# Patient Record
Sex: FEMALE | Race: WHITE | NOT HISPANIC OR LATINO | Employment: UNEMPLOYED | URBAN - METROPOLITAN AREA
[De-identification: names, ages, dates, MRNs, and addresses within clinical notes are randomized per-mention and may not be internally consistent; named-entity substitution may affect disease eponyms.]

---

## 2017-03-15 ENCOUNTER — TELEPHONE (OUTPATIENT)
Dept: FAMILY MEDICINE CLINIC | Facility: CLINIC | Age: 47
End: 2017-03-15

## 2017-03-15 RX ORDER — TRIAMCINOLONE ACETONIDE 0.25 MG/G
OINTMENT TOPICAL 2 TIMES DAILY
Qty: 15 G | Refills: 0 | Status: SHIPPED | OUTPATIENT
Start: 2017-03-15 | End: 2020-11-17

## 2017-03-15 NOTE — TELEPHONE ENCOUNTER
----- Message from Marian Scott sent at 3/15/2017  9:04 AM EDT -----  Contact: DR. GREEN;  JENNIFER Arnett was last seen in 2005 and she had some contact dermatitis and she was rx Traiamcinolone Acetonide 0.025% External Cream. She has had another flare up but is now out of the cream and wanted to know if Dr. Green could send in a new rx for it. It normally clears it up but she does not have enough of the cream to use this time.     Call back   [phone]

## 2017-11-06 ENCOUNTER — TRANSCRIBE ORDERS (OUTPATIENT)
Dept: MAMMOGRAPHY | Facility: HOSPITAL | Age: 47
End: 2017-11-06

## 2017-11-06 DIAGNOSIS — Z12.31 VISIT FOR SCREENING MAMMOGRAM: Primary | ICD-10-CM

## 2017-12-14 ENCOUNTER — HOSPITAL ENCOUNTER (OUTPATIENT)
Dept: MAMMOGRAPHY | Facility: HOSPITAL | Age: 47
Discharge: HOME | End: 2017-12-14
Attending: OBSTETRICS & GYNECOLOGY | Admitting: OBSTETRICS & GYNECOLOGY

## 2017-12-14 DIAGNOSIS — Z12.31 VISIT FOR SCREENING MAMMOGRAM: ICD-10-CM

## 2017-12-14 PROCEDURE — 77067 SCR MAMMO BI INCL CAD: CPT | Performed by: RADIOLOGY

## 2017-12-14 PROCEDURE — 77063 BREAST TOMOSYNTHESIS BI: CPT

## 2017-12-14 PROCEDURE — G0202 SCR MAMMO BI INCL CAD: HCPCS

## 2017-12-14 PROCEDURE — 77063 BREAST TOMOSYNTHESIS BI: CPT | Performed by: RADIOLOGY

## 2018-12-10 ENCOUNTER — TRANSCRIBE ORDERS (OUTPATIENT)
Dept: ADMINISTRATIVE | Facility: HOSPITAL | Age: 48
End: 2018-12-10

## 2018-12-10 DIAGNOSIS — Z12.31 VISIT FOR SCREENING MAMMOGRAM: Primary | ICD-10-CM

## 2019-01-16 ENCOUNTER — HOSPITAL ENCOUNTER (OUTPATIENT)
Dept: MAMMOGRAPHY | Facility: HOSPITAL | Age: 49
Discharge: HOME | End: 2019-01-16
Attending: OBSTETRICS & GYNECOLOGY | Admitting: OBSTETRICS & GYNECOLOGY

## 2019-01-16 DIAGNOSIS — Z12.31 VISIT FOR SCREENING MAMMOGRAM: ICD-10-CM

## 2019-01-16 PROCEDURE — 77063 BREAST TOMOSYNTHESIS BI: CPT | Performed by: RADIOLOGY

## 2019-01-16 PROCEDURE — 77067 SCR MAMMO BI INCL CAD: CPT

## 2019-01-16 PROCEDURE — 77067 SCR MAMMO BI INCL CAD: CPT | Performed by: RADIOLOGY

## 2019-01-16 PROCEDURE — 77063 BREAST TOMOSYNTHESIS BI: CPT

## 2019-01-28 ENCOUNTER — OFFICE VISIT (OUTPATIENT)
Dept: FAMILY MEDICINE CLINIC | Facility: CLINIC | Age: 49
End: 2019-01-28

## 2019-01-28 VITALS
DIASTOLIC BLOOD PRESSURE: 88 MMHG | HEIGHT: 66 IN | WEIGHT: 221 LBS | HEART RATE: 78 BPM | SYSTOLIC BLOOD PRESSURE: 142 MMHG | BODY MASS INDEX: 35.52 KG/M2 | RESPIRATION RATE: 16 BRPM | TEMPERATURE: 97.9 F

## 2019-01-28 DIAGNOSIS — L65.9 HAIR LOSS: ICD-10-CM

## 2019-01-28 DIAGNOSIS — F41.1 GENERALIZED ANXIETY DISORDER: ICD-10-CM

## 2019-01-28 DIAGNOSIS — I10 ESSENTIAL HYPERTENSION: Primary | ICD-10-CM

## 2019-01-28 PROBLEM — J30.9 ALLERGIC RHINITIS: Status: ACTIVE | Noted: 2019-01-28

## 2019-01-28 LAB
ALBUMIN SERPL-MCNC: 4.81 G/DL (ref 3.2–4.8)
ALBUMIN/GLOB SERPL: 2 G/DL (ref 1.5–2.5)
ALP SERPL-CCNC: 78 U/L (ref 25–100)
ALT SERPL-CCNC: 21 U/L (ref 7–40)
AST SERPL-CCNC: 24 U/L (ref 0–33)
BASOPHILS # BLD AUTO: 0.02 10*3/MM3 (ref 0–0.2)
BASOPHILS NFR BLD AUTO: 0.3 % (ref 0–1)
BILIRUB SERPL-MCNC: 0.4 MG/DL (ref 0.3–1.2)
BUN SERPL-MCNC: 11 MG/DL (ref 9–23)
BUN/CREAT SERPL: 13.3 (ref 7–25)
CALCIUM SERPL-MCNC: 9.3 MG/DL (ref 8.7–10.4)
CHLORIDE SERPL-SCNC: 100 MMOL/L (ref 99–109)
CHOLEST SERPL-MCNC: 120 MG/DL (ref 0–200)
CO2 SERPL-SCNC: 26 MMOL/L (ref 20–31)
CREAT SERPL-MCNC: 0.83 MG/DL (ref 0.6–1.3)
EOSINOPHIL # BLD AUTO: 0.06 10*3/MM3 (ref 0–0.3)
EOSINOPHIL NFR BLD AUTO: 0.8 % (ref 0–3)
ERYTHROCYTE [DISTWIDTH] IN BLOOD BY AUTOMATED COUNT: 13.8 % (ref 11.3–14.5)
GLOBULIN SER CALC-MCNC: 2.4 GM/DL
GLUCOSE SERPL-MCNC: 105 MG/DL (ref 70–100)
HCT VFR BLD AUTO: 39.6 % (ref 34.5–44)
HDLC SERPL-MCNC: 33 MG/DL (ref 40–60)
HGB BLD-MCNC: 12.4 G/DL (ref 11.5–15.5)
IMM GRANULOCYTES # BLD AUTO: 0.03 10*3/MM3 (ref 0–0.03)
IMM GRANULOCYTES NFR BLD AUTO: 0.4 % (ref 0–0.6)
LDLC SERPL CALC-MCNC: 60 MG/DL (ref 0–100)
LYMPHOCYTES # BLD AUTO: 1.87 10*3/MM3 (ref 0.6–4.8)
LYMPHOCYTES NFR BLD AUTO: 25.4 % (ref 24–44)
MCH RBC QN AUTO: 26.3 PG (ref 27–31)
MCHC RBC AUTO-ENTMCNC: 31.3 G/DL (ref 32–36)
MCV RBC AUTO: 84.1 FL (ref 80–99)
MONOCYTES # BLD AUTO: 0.39 10*3/MM3 (ref 0–1)
MONOCYTES NFR BLD AUTO: 5.3 % (ref 0–12)
NEUTROPHILS # BLD AUTO: 4.99 10*3/MM3 (ref 1.5–8.3)
NEUTROPHILS NFR BLD AUTO: 67.8 % (ref 41–71)
PLATELET # BLD AUTO: 266 10*3/MM3 (ref 150–450)
POTASSIUM SERPL-SCNC: 4 MMOL/L (ref 3.5–5.5)
PROT SERPL-MCNC: 7.2 G/DL (ref 5.7–8.2)
RBC # BLD AUTO: 4.71 10*6/MM3 (ref 3.89–5.14)
SODIUM SERPL-SCNC: 135 MMOL/L (ref 132–146)
TRIGL SERPL-MCNC: 135 MG/DL (ref 0–150)
TSH SERPL DL<=0.005 MIU/L-ACNC: 2.69 MIU/ML (ref 0.35–5.35)
VLDLC SERPL CALC-MCNC: 27 MG/DL
WBC # BLD AUTO: 7.36 10*3/MM3 (ref 3.5–10.8)

## 2019-01-28 PROCEDURE — 99214 OFFICE O/P EST MOD 30 MIN: CPT | Performed by: FAMILY MEDICINE

## 2019-01-28 RX ORDER — ESOMEPRAZOLE MAGNESIUM 40 MG/1
CAPSULE, DELAYED RELEASE ORAL
COMMUNITY
Start: 2013-05-22 | End: 2019-07-01 | Stop reason: SDUPTHER

## 2019-01-28 RX ORDER — PROPRANOLOL HYDROCHLORIDE 80 MG/1
80 CAPSULE, EXTENDED RELEASE ORAL DAILY
Qty: 30 CAPSULE | Refills: 2 | Status: SHIPPED | OUTPATIENT
Start: 2019-01-28 | End: 2019-02-27 | Stop reason: SDUPTHER

## 2019-02-27 ENCOUNTER — OFFICE VISIT (OUTPATIENT)
Dept: FAMILY MEDICINE CLINIC | Facility: CLINIC | Age: 49
End: 2019-02-27

## 2019-02-27 VITALS
SYSTOLIC BLOOD PRESSURE: 122 MMHG | WEIGHT: 224 LBS | BODY MASS INDEX: 36 KG/M2 | DIASTOLIC BLOOD PRESSURE: 82 MMHG | HEART RATE: 74 BPM | TEMPERATURE: 98.1 F | HEIGHT: 66 IN | RESPIRATION RATE: 16 BRPM

## 2019-02-27 DIAGNOSIS — I10 ESSENTIAL HYPERTENSION: Primary | ICD-10-CM

## 2019-02-27 DIAGNOSIS — F41.1 GENERALIZED ANXIETY DISORDER: ICD-10-CM

## 2019-02-27 PROCEDURE — 99213 OFFICE O/P EST LOW 20 MIN: CPT | Performed by: FAMILY MEDICINE

## 2019-02-27 RX ORDER — PROPRANOLOL HYDROCHLORIDE 80 MG/1
80 CAPSULE, EXTENDED RELEASE ORAL DAILY
Qty: 30 CAPSULE | Refills: 2 | Status: SHIPPED | OUTPATIENT
Start: 2019-02-27 | End: 2019-06-07 | Stop reason: SDUPTHER

## 2019-02-27 NOTE — PROGRESS NOTES
Subjective   Edwige Nichols is a 48 y.o. female.     History of Present Illness     She is here to recheck her BP  Taking the inderal without issues  Her BP is much better today    She has failed medications in the pat for her mood and had major issues  She lost her job due to bosses death and on a tight budget  father's health    Review of Systems   Psychiatric/Behavioral: The patient is nervous/anxious.        Objective   Physical Exam   Constitutional: She appears well-developed and well-nourished. No distress.   Cardiovascular: Normal rate, regular rhythm and normal heart sounds.   Pulmonary/Chest: Effort normal and breath sounds normal.   Psychiatric: She has a normal mood and affect. Her behavior is normal. Judgment and thought content normal.   Nursing note and vitals reviewed.      Assessment/Plan   Edwige was seen today for follow-up and hypertension.    Diagnoses and all orders for this visit:    Essential hypertension  -     propranolol LA (INDERAL LA) 80 MG 24 hr capsule; Take 1 capsule by mouth Daily.    Generalized anxiety disorder    her BP looks great today, will continue the inderal    I do think she needs medication help for her mood.  She does not want meds due to past history of side effects.  Offered genetic testing for psych meds but we are unsure of cost and so she declined  Names for counselors

## 2019-06-07 DIAGNOSIS — I10 ESSENTIAL HYPERTENSION: ICD-10-CM

## 2019-06-07 RX ORDER — PROPRANOLOL HYDROCHLORIDE 80 MG/1
80 CAPSULE, EXTENDED RELEASE ORAL DAILY
Qty: 30 CAPSULE | Refills: 0 | Status: SHIPPED | OUTPATIENT
Start: 2019-06-07 | End: 2019-07-01 | Stop reason: SDUPTHER

## 2019-07-01 ENCOUNTER — OFFICE VISIT (OUTPATIENT)
Dept: FAMILY MEDICINE CLINIC | Facility: CLINIC | Age: 49
End: 2019-07-01

## 2019-07-01 VITALS
RESPIRATION RATE: 16 BRPM | BODY MASS INDEX: 35.84 KG/M2 | HEART RATE: 66 BPM | TEMPERATURE: 98 F | SYSTOLIC BLOOD PRESSURE: 130 MMHG | WEIGHT: 223 LBS | HEIGHT: 66 IN | DIASTOLIC BLOOD PRESSURE: 83 MMHG

## 2019-07-01 DIAGNOSIS — K21.9 GASTROESOPHAGEAL REFLUX DISEASE, ESOPHAGITIS PRESENCE NOT SPECIFIED: ICD-10-CM

## 2019-07-01 DIAGNOSIS — F41.1 GENERALIZED ANXIETY DISORDER: ICD-10-CM

## 2019-07-01 DIAGNOSIS — I10 ESSENTIAL HYPERTENSION: Primary | ICD-10-CM

## 2019-07-01 DIAGNOSIS — J30.89 SEASONAL ALLERGIC RHINITIS DUE TO OTHER ALLERGIC TRIGGER: ICD-10-CM

## 2019-07-01 PROCEDURE — 99214 OFFICE O/P EST MOD 30 MIN: CPT | Performed by: FAMILY MEDICINE

## 2019-07-01 PROCEDURE — 3008F BODY MASS INDEX DOCD: CPT | Performed by: FAMILY MEDICINE

## 2019-07-01 PROCEDURE — G8417 CALC BMI ABV UP PARAM F/U: HCPCS | Performed by: FAMILY MEDICINE

## 2019-07-01 RX ORDER — PROPRANOLOL HYDROCHLORIDE 80 MG/1
80 CAPSULE, EXTENDED RELEASE ORAL DAILY
Qty: 30 CAPSULE | Refills: 0 | Status: SHIPPED | OUTPATIENT
Start: 2019-07-01 | End: 2019-08-10 | Stop reason: SDUPTHER

## 2019-07-01 RX ORDER — ESOMEPRAZOLE MAGNESIUM 40 MG/1
40 CAPSULE, DELAYED RELEASE ORAL DAILY
Qty: 90 CAPSULE | Refills: 1 | Status: SHIPPED | OUTPATIENT
Start: 2019-07-01 | End: 2020-01-03 | Stop reason: SDUPTHER

## 2019-07-01 NOTE — PROGRESS NOTES
Subjective   Edwige Nichols is a 49 y.o. female.     History of Present Illness     Edwige Nichols  is here for follow-up of hypertension of several years duration. She is not exercising and is not adherent to a low-salt diet. Patient does not check her blood pressure.  Patient denies chest pain and palpitations. Cardiovascular risk factors: hypertension and obesity (BMI >= 30 kg/m2). She is compliant with meds.      Her allergies have been well controlled with zyrtec this season  No major issues  She does take the medicine as needed    GERD is stable  She does use nexium for this and has been happy with resulkts  No SE and no issues    She continues to see counseling for her anxiety  She had genetic testing done to see what medicine would be optimal for her as we have failed multiple options here  She does feel her mood is doing well    The following portions of the patient's history were reviewed and updated as appropriate: allergies, current medications, past family history, past medical history, past social history, past surgical history and problem list.    Review of Systems   Constitutional: Negative.    HENT: Negative.    Eyes: Negative.    Respiratory: Negative.  Negative for shortness of breath.    Cardiovascular: Negative.  Negative for chest pain.   Gastrointestinal: Negative.  Negative for constipation and diarrhea.   Musculoskeletal: Negative.    Skin: Negative.    Neurological: Negative.    Psychiatric/Behavioral: Negative.  Negative for dysphoric mood. The patient is not nervous/anxious.    All other systems reviewed and are negative.      Objective   Physical Exam   Constitutional: She is oriented to person, place, and time. She appears well-developed and well-nourished. No distress.   Cardiovascular: Normal rate, regular rhythm and normal heart sounds.   Pulmonary/Chest: Effort normal and breath sounds normal.   Abdominal: Soft. Bowel sounds are normal. She exhibits no distension. There is no  tenderness.   Neurological: She is alert and oriented to person, place, and time.   Psychiatric: She has a normal mood and affect. Her behavior is normal. Judgment and thought content normal.   Nursing note and vitals reviewed.      Assessment/Plan   Edwige was seen today for hypertension and med refill.    Diagnoses and all orders for this visit:    Essential hypertension  -     propranolol LA (INDERAL LA) 80 MG 24 hr capsule; Take 1 capsule by mouth Daily.    Gastroesophageal reflux disease, esophagitis presence not specified  -     esomeprazole (NEXIUM) 40 MG capsule; Take 1 capsule by mouth Daily.    Seasonal allergic rhinitis due to other allergic trigger  -     Cetirizine HCl (ZYRTEC ALLERGY) 10 MG capsule; 1 po daily    Generalized anxiety disorder    BP looking good today, will continue propranolol unchanged  Allergies have been controlled with zyrtec,will see if insurance covers and script sent in  She still uses the nexium PRN, refilled  Anxiety stable, work up ongoing with psych.  Treatment per them at this time.

## 2019-08-10 DIAGNOSIS — I10 ESSENTIAL HYPERTENSION: ICD-10-CM

## 2019-08-12 RX ORDER — PROPRANOLOL HYDROCHLORIDE 80 MG/1
80 CAPSULE, EXTENDED RELEASE ORAL DAILY
Qty: 30 CAPSULE | Refills: 1 | Status: SHIPPED | OUTPATIENT
Start: 2019-08-12 | End: 2019-12-18 | Stop reason: SDUPTHER

## 2019-12-12 ENCOUNTER — TRANSCRIBE ORDERS (OUTPATIENT)
Dept: ADMINISTRATIVE | Facility: HOSPITAL | Age: 49
End: 2019-12-12

## 2019-12-12 DIAGNOSIS — Z12.31 VISIT FOR SCREENING MAMMOGRAM: Primary | ICD-10-CM

## 2019-12-18 DIAGNOSIS — I10 ESSENTIAL HYPERTENSION: ICD-10-CM

## 2019-12-18 RX ORDER — PROPRANOLOL HYDROCHLORIDE 80 MG/1
80 CAPSULE, EXTENDED RELEASE ORAL DAILY
Qty: 30 CAPSULE | Refills: 0 | Status: SHIPPED | OUTPATIENT
Start: 2019-12-18 | End: 2020-01-03 | Stop reason: SDUPTHER

## 2020-01-03 ENCOUNTER — OFFICE VISIT (OUTPATIENT)
Dept: FAMILY MEDICINE CLINIC | Facility: CLINIC | Age: 50
End: 2020-01-03

## 2020-01-03 VITALS
RESPIRATION RATE: 16 BRPM | DIASTOLIC BLOOD PRESSURE: 92 MMHG | SYSTOLIC BLOOD PRESSURE: 128 MMHG | HEART RATE: 78 BPM | TEMPERATURE: 98.4 F | HEIGHT: 66 IN | BODY MASS INDEX: 35.84 KG/M2 | WEIGHT: 223 LBS

## 2020-01-03 DIAGNOSIS — F41.1 GENERALIZED ANXIETY DISORDER: ICD-10-CM

## 2020-01-03 DIAGNOSIS — M79.10 MYALGIA: ICD-10-CM

## 2020-01-03 DIAGNOSIS — K21.9 GASTROESOPHAGEAL REFLUX DISEASE, ESOPHAGITIS PRESENCE NOT SPECIFIED: ICD-10-CM

## 2020-01-03 DIAGNOSIS — I10 ESSENTIAL HYPERTENSION: Primary | ICD-10-CM

## 2020-01-03 PROCEDURE — 99214 OFFICE O/P EST MOD 30 MIN: CPT | Performed by: FAMILY MEDICINE

## 2020-01-03 RX ORDER — PROPRANOLOL HYDROCHLORIDE 80 MG/1
80 CAPSULE, EXTENDED RELEASE ORAL DAILY
Qty: 90 CAPSULE | Refills: 1 | Status: SHIPPED | OUTPATIENT
Start: 2020-01-03 | End: 2020-11-17

## 2020-01-03 RX ORDER — ESOMEPRAZOLE MAGNESIUM 40 MG/1
40 CAPSULE, DELAYED RELEASE ORAL DAILY
Qty: 90 CAPSULE | Refills: 1 | Status: SHIPPED | OUTPATIENT
Start: 2020-01-03 | End: 2020-11-17

## 2020-01-03 NOTE — PROGRESS NOTES
Subjective   Edwige Nichols is a 49 y.o. female.     History of Present Illness     Edwige Nichols  is here for follow-up of hypertension of several years duration. She is not exercising and is not adherent to a low-salt diet. Patient does not check her blood pressure.   Patient denies chest pain and palpitations. Cardiovascular risk factors: hypertension and obesity (BMI >= 30 kg/m2). She is compliant with meds.      She does complains of body hurting all the time  Has some pelvic floor issues that makes exercise hard and her hips hurt as well.  Tried some PT without help  Hard to stand more than 20 minutes in a row due to this discomfort  Muscles just sore all the time all over  Her joints do not swell but can be quite stiff      GERD has been doing ok with prevacid or protonix she gets OTC  We had that she was on nexium, she feels they all work well  She does want to stay on PPI for her reflux.    The following portions of the patient's history were reviewed and updated as appropriate: allergies, current medications, past family history, past medical history, past social history, past surgical history and problem list.    Review of Systems   Constitutional: Negative.    HENT: Negative.    Eyes: Negative.    Respiratory: Negative.  Negative for shortness of breath.    Cardiovascular: Negative.  Negative for chest pain.   Gastrointestinal: Negative.    Musculoskeletal: Positive for myalgias.   Skin: Negative.    Neurological: Negative.    Psychiatric/Behavioral: The patient is nervous/anxious.    All other systems reviewed and are negative.      Objective   Physical Exam   Constitutional: She is oriented to person, place, and time. She appears well-developed and well-nourished. No distress.   Cardiovascular: Normal rate, regular rhythm and normal heart sounds.   Pulmonary/Chest: Effort normal and breath sounds normal.   Neurological: She is alert and oriented to person, place, and time.   Psychiatric: She has a  normal mood and affect. Her behavior is normal. Judgment and thought content normal.   Nursing note and vitals reviewed.      Assessment/Plan   Edwige was seen today for hypertension.    Diagnoses and all orders for this visit:    Essential hypertension  -     CBC & Differential  -     Comprehensive Metabolic Panel  -     propranolol LA (INDERAL LA) 80 MG 24 hr capsule; Take 1 capsule by mouth Daily.    Gastroesophageal reflux disease, esophagitis presence not specified  -     esomeprazole (nexIUM) 40 MG capsule; Take 1 capsule by mouth Daily.    Myalgia  -     HIGINIO With / DsDNA, RNP, Sjogrens A / B, Smith  -     Sedimentation Rate  -     Rheumatoid Factor  -     Ambulatory Referral to Rheumatology    Generalized anxiety disorder    BP stable, does get higher with conversation and appointment.  Likely anxiety related.  Continue medicine and monitor in the future.  Will continue PPI for GERD  Ok lab work for myalgia as well as rheum evaluation  Psych is working on mood management.  Will start buspar soon.  Control of anxiety would help BP control as well.

## 2020-01-06 LAB
ALBUMIN SERPL-MCNC: 4.8 G/DL (ref 3.5–5.2)
ALBUMIN/GLOB SERPL: 1.8 G/DL
ALP SERPL-CCNC: 77 U/L (ref 39–117)
ALT SERPL-CCNC: 23 U/L (ref 1–33)
ANA SER QL: POSITIVE
AST SERPL-CCNC: 22 U/L (ref 1–32)
BASOPHILS # BLD AUTO: 0.05 10*3/MM3 (ref 0–0.2)
BASOPHILS NFR BLD AUTO: 0.7 % (ref 0–1.5)
BILIRUB SERPL-MCNC: 0.4 MG/DL (ref 0.2–1.2)
BUN SERPL-MCNC: 13 MG/DL (ref 6–20)
BUN/CREAT SERPL: 14.1 (ref 7–25)
CALCIUM SERPL-MCNC: 9.3 MG/DL (ref 8.6–10.5)
CENTROMERE B AB SER-ACNC: <0.2 AI (ref 0–0.9)
CHLORIDE SERPL-SCNC: 99 MMOL/L (ref 98–107)
CHROMATIN AB SERPL-ACNC: <0.2 AI (ref 0–0.9)
CO2 SERPL-SCNC: 24.4 MMOL/L (ref 22–29)
CREAT SERPL-MCNC: 0.92 MG/DL (ref 0.57–1)
DSDNA AB SER-ACNC: <1 IU/ML (ref 0–9)
ENA JO1 AB SER-ACNC: <0.2 AI (ref 0–0.9)
ENA RNP AB SER-ACNC: <0.2 AI (ref 0–0.9)
ENA SCL70 AB SER-ACNC: <0.2 AI (ref 0–0.9)
ENA SM AB SER-ACNC: <0.2 AI (ref 0–0.9)
ENA SS-A AB SER-ACNC: <0.2 AI (ref 0–0.9)
ENA SS-B AB SER-ACNC: 2.4 AI (ref 0–0.9)
EOSINOPHIL # BLD AUTO: 0.08 10*3/MM3 (ref 0–0.4)
EOSINOPHIL NFR BLD AUTO: 1.1 % (ref 0.3–6.2)
ERYTHROCYTE [DISTWIDTH] IN BLOOD BY AUTOMATED COUNT: 13.4 % (ref 12.3–15.4)
ERYTHROCYTE [SEDIMENTATION RATE] IN BLOOD BY WESTERGREN METHOD: 13 MM/HR (ref 0–20)
GLOBULIN SER CALC-MCNC: 2.7 GM/DL
GLUCOSE SERPL-MCNC: 94 MG/DL (ref 65–99)
HCT VFR BLD AUTO: 38.7 % (ref 34–46.6)
HGB BLD-MCNC: 12.6 G/DL (ref 12–15.9)
IMM GRANULOCYTES # BLD AUTO: 0.02 10*3/MM3 (ref 0–0.05)
IMM GRANULOCYTES NFR BLD AUTO: 0.3 % (ref 0–0.5)
LYMPHOCYTES # BLD AUTO: 2.33 10*3/MM3 (ref 0.7–3.1)
LYMPHOCYTES NFR BLD AUTO: 31.6 % (ref 19.6–45.3)
Lab: ABNORMAL
MCH RBC QN AUTO: 26.7 PG (ref 26.6–33)
MCHC RBC AUTO-ENTMCNC: 32.6 G/DL (ref 31.5–35.7)
MCV RBC AUTO: 82 FL (ref 79–97)
MONOCYTES # BLD AUTO: 0.33 10*3/MM3 (ref 0.1–0.9)
MONOCYTES NFR BLD AUTO: 4.5 % (ref 5–12)
NEUTROPHILS # BLD AUTO: 4.57 10*3/MM3 (ref 1.7–7)
NEUTROPHILS NFR BLD AUTO: 61.8 % (ref 42.7–76)
NRBC BLD AUTO-RTO: 0 /100 WBC (ref 0–0.2)
PLATELET # BLD AUTO: 280 10*3/MM3 (ref 140–450)
POTASSIUM SERPL-SCNC: 4.4 MMOL/L (ref 3.5–5.2)
PROT SERPL-MCNC: 7.5 G/DL (ref 6–8.5)
RBC # BLD AUTO: 4.72 10*6/MM3 (ref 3.77–5.28)
RHEUMATOID FACT SERPL-ACNC: <10 IU/ML (ref 0–13.9)
SODIUM SERPL-SCNC: 139 MMOL/L (ref 136–145)
WBC # BLD AUTO: 7.38 10*3/MM3 (ref 3.4–10.8)

## 2020-02-07 ENCOUNTER — HOSPITAL ENCOUNTER (OUTPATIENT)
Dept: MAMMOGRAPHY | Facility: HOSPITAL | Age: 50
Discharge: HOME | End: 2020-02-07
Admitting: OBSTETRICS & GYNECOLOGY

## 2020-02-07 DIAGNOSIS — Z12.31 VISIT FOR SCREENING MAMMOGRAM: ICD-10-CM

## 2020-02-07 PROCEDURE — 77067 SCR MAMMO BI INCL CAD: CPT

## 2020-02-07 PROCEDURE — 77067 SCR MAMMO BI INCL CAD: CPT | Performed by: RADIOLOGY

## 2020-02-07 PROCEDURE — 77063 BREAST TOMOSYNTHESIS BI: CPT

## 2020-02-07 PROCEDURE — 77063 BREAST TOMOSYNTHESIS BI: CPT | Performed by: RADIOLOGY

## 2020-10-16 ENCOUNTER — OFFICE VISIT (OUTPATIENT)
Dept: OBSTETRICS AND GYNECOLOGY | Facility: CLINIC | Age: 50
End: 2020-10-16

## 2020-10-16 VITALS
SYSTOLIC BLOOD PRESSURE: 120 MMHG | BODY MASS INDEX: 34.55 KG/M2 | HEIGHT: 66 IN | TEMPERATURE: 97.9 F | WEIGHT: 215 LBS | DIASTOLIC BLOOD PRESSURE: 80 MMHG

## 2020-10-16 DIAGNOSIS — Z00.00 ANNUAL PHYSICAL EXAM: Primary | ICD-10-CM

## 2020-10-16 DIAGNOSIS — E66.9 OBESITY (BMI 30-39.9): ICD-10-CM

## 2020-10-16 PROCEDURE — 99396 PREV VISIT EST AGE 40-64: CPT | Performed by: NURSE PRACTITIONER

## 2020-10-16 NOTE — PROGRESS NOTES
GYN Annual Exam     CC - Here for annual exam.        HPI  Edwige Nichols is a 50 y.o. female, , who presents for annual well woman exam. Patient's last menstrual period was 2020 (exact date)..  Periods are becoming irregular.  Dysmenorrhea:mild, occurring premenstrually and first 1-2 days of flow.  Patient reports problems with: none.  Partner Status: Marital Status: .  New Partners since last visit: no.  Desires STD Screening: no. The patient had an annual exam one year ago. Since her last visit she was diagnosed with Sjorgens. Her last mammogram was 20 and negative. Her periods are becoming irregular and she has started having pre-menstrual headaches.    Additional OB/GYN History   Current contraception: contraceptive methods: Condoms  Desires to: continue contraception  Last Pap : 2019 negative  Last Completed Pap Smear       Status Date      PAP SMEAR No completions recorded        History of abnormal Pap smear: no  Family history of uterine, colon, breast, or ovarian cancer: yes - maternal grandmother had breast cancer at age 80  Performs monthly Self-Breast Exam: yes  Last mammogram: 2020 negative  Last Completed Mammogram     Patient has no health maintenance due at this time         Exercises Regularly: no  Feelings of Anxiety or Depression: yes - not treated  Tobacco Usage?: No   OB History        1    Para   1    Term   1            AB        Living           SAB        TAB        Ectopic        Molar        Multiple        Live Births                    Health Maintenance   Topic Date Due   • Annual Gynecologic Pelvic and Breast Exam  1971   • ANNUAL PHYSICAL  1974   • TDAP/TD VACCINES (1 - Tdap) 1990   • HEPATITIS C SCREENING  03/15/2017   • PAP SMEAR  03/15/2017   • INFLUENZA VACCINE  2020   • COLONOSCOPY  2027   • Pneumococcal Vaccine 0-64  Aged Out       The additional following portions of the patient's history were  "reviewed and updated as appropriate: allergies, current medications, past family history, past medical history, past social history, past surgical history and problem list.    Review of Systems   Constitutional: Negative.    HENT: Negative.    Eyes: Negative.    Respiratory: Negative.    Cardiovascular: Negative.    Gastrointestinal: Negative.    Endocrine: Negative.    Genitourinary: Negative.    Musculoskeletal: Negative.    Skin: Negative.    Allergic/Immunologic: Negative.    Neurological: Negative.    Hematological: Negative.    Psychiatric/Behavioral: Negative.      All other systems reviewed and are negative.     I have reviewed and agree with the HPI, ROS, and historical information as entered above. Keiko Gann, APRN    Objective   /80   Temp 97.9 °F (36.6 °C)   Ht 167.6 cm (66\")   Wt 97.5 kg (215 lb)   LMP 09/08/2020 (Exact Date)   Breastfeeding No Comment: perimenopause  BMI 34.70 kg/m²     Physical Exam  Vitals signs and nursing note reviewed. Exam conducted with a chaperone present.   Constitutional:       Appearance: Normal appearance. She is well-developed. She is obese.   HENT:      Head: Normocephalic and atraumatic.   Neck:      Musculoskeletal: Normal range of motion. No muscular tenderness.      Thyroid: No thyroid mass or thyromegaly.   Cardiovascular:      Rate and Rhythm: Normal rate and regular rhythm.      Heart sounds: No murmur.   Pulmonary:      Effort: Pulmonary effort is normal. No retractions.      Breath sounds: Normal breath sounds. No wheezing, rhonchi or rales.   Chest:      Chest wall: No mass or tenderness.      Breasts:         Right: Normal. No mass, nipple discharge, skin change or tenderness.         Left: Normal. No mass, nipple discharge, skin change or tenderness.   Abdominal:      General: Bowel sounds are normal.      Palpations: Abdomen is soft. Abdomen is not rigid. There is no mass.      Tenderness: There is no abdominal tenderness. There is no guarding. "      Hernia: No hernia is present. There is no hernia in the left inguinal area.   Genitourinary:     Labia:         Right: No rash, tenderness or lesion.         Left: No rash, tenderness or lesion.       Vagina: Normal. No vaginal discharge or lesions.      Cervix: No cervical motion tenderness, discharge, lesion or cervical bleeding.      Uterus: Normal. Not enlarged, not fixed and not tender.       Adnexa:         Right: No mass or tenderness.          Left: No mass or tenderness.        Rectum: No external hemorrhoid.   Neurological:      Mental Status: She is alert and oriented to person, place, and time.   Psychiatric:         Behavior: Behavior normal.            Assessment and Plan    Problem List Items Addressed This Visit     None      Visit Diagnoses     Annual physical exam    -  Primary    Relevant Orders    Pap IG, HPV-hr    Obesity (BMI 30-39.9)              1. GYN annual well woman exam.   2. Reviewed monthly self breast exams.  Instructed to call with lumps, pain, or breast discharge.    3. Recommended use of Vitamin D replacement and getting adequate calcium in her diet. (1500mg)  4. Reviewed exercise as a preventative health measures.   5. Reccommended Flu Vaccine in Fall of each year.  6. Symptoms of menopausal transition reviewed with patient.   7. RTC in 1 year or PRN with problems.    Keiko Gann, APRN  10/16/2020

## 2020-11-03 DIAGNOSIS — Z00.00 ANNUAL PHYSICAL EXAM: ICD-10-CM

## 2020-11-17 ENCOUNTER — OFFICE VISIT (OUTPATIENT)
Dept: NEUROLOGY | Facility: CLINIC | Age: 50
End: 2020-11-17

## 2020-11-17 VITALS
HEIGHT: 66 IN | HEART RATE: 89 BPM | BODY MASS INDEX: 34.91 KG/M2 | WEIGHT: 217.2 LBS | TEMPERATURE: 97.5 F | SYSTOLIC BLOOD PRESSURE: 150 MMHG | OXYGEN SATURATION: 97 % | DIASTOLIC BLOOD PRESSURE: 96 MMHG

## 2020-11-17 DIAGNOSIS — R20.2 PARESTHESIAS: Primary | ICD-10-CM

## 2020-11-17 DIAGNOSIS — G43.009 MIGRAINE WITHOUT AURA AND WITHOUT STATUS MIGRAINOSUS, NOT INTRACTABLE: ICD-10-CM

## 2020-11-17 DIAGNOSIS — R42 DIZZINESS: ICD-10-CM

## 2020-11-17 DIAGNOSIS — R23.0 BLUISH SKIN DISCOLORATION: ICD-10-CM

## 2020-11-17 PROCEDURE — 99204 OFFICE O/P NEW MOD 45 MIN: CPT | Performed by: PSYCHIATRY & NEUROLOGY

## 2020-11-17 RX ORDER — LANSOPRAZOLE 15 MG/1
15 CAPSULE, DELAYED RELEASE ORAL AS NEEDED
COMMUNITY

## 2020-11-17 RX ORDER — VENLAFAXINE HYDROCHLORIDE 37.5 MG/1
37.5 CAPSULE, EXTENDED RELEASE ORAL DAILY
Qty: 30 CAPSULE | Refills: 2 | Status: SHIPPED | OUTPATIENT
Start: 2020-11-17 | End: 2021-01-12

## 2020-11-17 NOTE — PROGRESS NOTES
Subjective:    CC: Edwige Nichols is seen today in consultation for dizziness, paresthesias, bluish discoloration of skin and Numbness       HPI:  Patient is a 50-year-old female with past medical history of confirm Sjogren's syndrome, vocal cord dysfunction, IBS, fibromyalgia referred to the clinic for episodic dizziness, paresthesias in both the hands and arms as well as bluish discoloration involving the skin of fingers of both hands.  She reports that dizzy spells started about 5 years ago and she initially noted that whenever she is on any form of height, the dizziness is heightened.  It is difficult for her to climb ladder now because of this.  She reports that occasionally, she also has dizziness when she is not on any higher altitude.  She also reports associated ringing in her ears.  In addition to this, she reports frequent occipital area headache which sometimes may radiate to involve top of her head.  She reports that the most important concern is bluish discoloration of skin involving fingers of both hands.  She reports that if she has to carry anything heavy she will end up wearing wrist brace discoloration which would lead to electrical shocklike sensation, pain that may last for few hours.  Sometimes even if she has not held anything heavy but has something in her hand despite picking up small things may lead to bruise discoloration leading to pain, paresthesias, dysesthesias lasting for 30 to 40 minutes.  The bluish discoloration takes about 48 hours to subside.  This is been going on for last several years.  In addition to this, she also reports tingling in her arms, fingers and sometimes pain.  The symptoms are particularly worse at nighttime and sometimes may wake her up in the middle of the night.  She reports that back in 1990s, she underwent EMG/nerve conduction study and she was told that she may have carpal tunnel syndrome.  She also reports having MRI performed about 10 to 11 years ago when  she saw Dr. Davis and reports that she was told that it was normal.  Her father has diagnosis of myasthenia gravis.  She also reports uncontrolled anxiety.        The following portions of the patient's history were reviewed today and updated as of 11/17/2020  : allergies, social history and problem list.  This document will be scanned to patient's chart.      Current Outpatient Medications:   •  Carboxymethylcellulose Sodium (EYE DROPS OP), Apply  to eye(s) as directed by provider., Disp: , Rfl:   •  lansoprazole (PREVACID) 15 MG capsule, Take 15 mg by mouth Daily., Disp: , Rfl:    Past Medical History:   Diagnosis Date   • Costochondritis    • Depression    • Fibromyalgia    • IBS (irritable bowel syndrome)    • Muscle disorder    • Muscle pain    • Sacro-iliac pain     joint dysfunction   • Screening breast examination     Slef; Admits   • Sjogren's disease (CMS/HCC)    • Urethral syndrome    • Vocal cord dysfunction       Past Surgical History:   Procedure Laterality Date   • BREAST BIOPSY Left    • COLONOSCOPY     • LAPAROSCOPIC CHOLECYSTECTOMY     • REDUCTION MAMMAPLASTY Bilateral 2014   • WISDOM TOOTH EXTRACTION        Family History   Problem Relation Age of Onset   • Breast cancer Maternal Grandmother 80   • No Known Problems Mother    • Myasthenia gravis Father    • Hypertension Father    • Arthritis Other    • Diabetes Other    • Melanoma Other    • Ovarian cancer Neg Hx       Review of Systems   Constitutional: Negative.    HENT: Negative.    Eyes: Negative.    Respiratory: Negative.    Cardiovascular: Negative.    Gastrointestinal: Negative.    Endocrine: Negative.    Genitourinary: Positive for pelvic pain.   Musculoskeletal: Negative.    Skin: Negative.    Allergic/Immunologic: Negative.    Neurological: Positive for dizziness, numbness and headache.   Psychiatric/Behavioral: Positive for depressed mood. The patient is nervous/anxious.        All other systems reviewed and are negative    "  Objective:    /96   Pulse 89   Temp 97.5 °F (36.4 °C)   Ht 167.6 cm (66\")   Wt 98.5 kg (217 lb 3.2 oz)   SpO2 97%   BMI 35.06 kg/m²     Neurology Exam:    General apperance: NAD.     Mental status: Alert, awake and oriented to time place and person.    Recent and Remote memory: Can recall 3/3 objects at 5 minutes. Can recall historical events.     Attention span and Concentration: Serial 7s: Normal.     Fund of knowledge:  Normal.     Language and Speech: No aphasia or dysarthria.    Naming , Repitition and Comprehension:  Can name objects, repeat a sentence and follow commands. Speech is clear and fluent with good repetition, comprehension, and naming.    Cranial Nerves:   CN II: Visual fields are full. Intact. Fundi - Normal, No papillederma, Pupils - SERENITY  CN III, IV and VI: Extraocular movements are intact. Normal saccades.   CN V: Facial sensation is intact.   CN VII: Muscles of facial expression reveal no asymmetry. Intact.   CN VIII: Hearing is intact. Whispered voice intact.   CN IX and X: Palate elevates symmetrically. Intact  CN XI: Shoulder shrug is intact.   CN XII: Tongue is midline without evidence of atrophy or fasciculation.     Motor:  Right UE muscle strength 5/5. Normal tone.     Left UE muscle strength 5/5. Normal tone.      Right LE muscle strength5/5. Normal tone.     Left LE muscle strength 5/5. Normal tone.      Sensory: Normal light touch, vibration and pinprick sensation bilaterally.    DTRs: 2+ bilaterally in upper and lower extremities.  Positive Tinel's test bilaterally at the wrist.    Babinski: Negative bilaterally.    Co-ordination: Normal finger-to-nose, heel to shin B/L.    Rhomberg: Negative.    Gait: Normal.    Cardiovascular: Regular rate and rhythm without murmur, gallop or rub.    Ophthalmoscopic exam: Normal fundi, no papilledema.    Assessment and Plan:  1. Paresthesias  2. Dizziness  3. Migraine without aura and without status migrainosus, not intractable  4. " Bluish skin discoloration  -Likely carpal tunnel syndrome causing paresthesias in both arms and hands particularly becoming worse at night.  I am going to schedule her for EMG/nerve conduction study for the confirmation of the diagnosis and also to determine severity.  I am unsure as to what could be the reason for the bluish discoloration involving the skin of fingers of both hands.  Typically neuropathy will not cause any skin discoloration.  She is reporting dizziness which has been ongoing associated with episodes of ringing in her years.  She has not had ENT evaluations I would like to obtain ENT consultation for further evaluation.  Since she is reporting frequent headaches/migraines, I am going to start her on Effexor 37.5 mg daily and see how she does.  She is reporting uncontrolled anxiety as well and hopefully Effexor should help with that as well.  I plan to see her back in 8 weeks for follow-up.       Return in about 8 weeks (around 1/12/2021).     Tian Vieira MD

## 2021-01-05 ENCOUNTER — TRANSCRIBE ORDERS (OUTPATIENT)
Dept: OBSTETRICS AND GYNECOLOGY | Facility: CLINIC | Age: 51
End: 2021-01-05

## 2021-01-05 DIAGNOSIS — Z12.31 VISIT FOR SCREENING MAMMOGRAM: Primary | ICD-10-CM

## 2021-01-12 ENCOUNTER — OFFICE VISIT (OUTPATIENT)
Dept: NEUROLOGY | Facility: CLINIC | Age: 51
End: 2021-01-12

## 2021-01-12 VITALS
SYSTOLIC BLOOD PRESSURE: 144 MMHG | TEMPERATURE: 97.3 F | BODY MASS INDEX: 35.07 KG/M2 | OXYGEN SATURATION: 99 % | DIASTOLIC BLOOD PRESSURE: 94 MMHG | HEART RATE: 83 BPM | HEIGHT: 66 IN

## 2021-01-12 DIAGNOSIS — R20.2 PARESTHESIAS: Primary | ICD-10-CM

## 2021-01-12 DIAGNOSIS — R42 DIZZINESS: ICD-10-CM

## 2021-01-12 DIAGNOSIS — R23.0 BLUISH SKIN DISCOLORATION: ICD-10-CM

## 2021-01-12 PROCEDURE — 99214 OFFICE O/P EST MOD 30 MIN: CPT | Performed by: PSYCHIATRY & NEUROLOGY

## 2021-01-12 RX ORDER — CETIRIZINE HYDROCHLORIDE 10 MG/1
CAPSULE, LIQUID FILLED ORAL
COMMUNITY
End: 2021-11-29

## 2021-01-12 NOTE — PROGRESS NOTES
Subjective:    CC: Edwige Nichols is in clinic today for follow up for history of paresthesias in both the hands, bluish discoloration of skin of both the hands and dizziness.    HPI:  Patient is a 50-year-old female with past medical history of  Sjogren's syndrome, vocal cord dysfunction, IBS, fibromyalgia referred to the clinic for episodic dizziness, paresthesias in both the hands and arms as well as bluish discoloration involving the skin of fingers of both hands.  She reports that dizzy spells started about 5 years ago and she initially noted that whenever she is on any form of height, the dizziness is heightened.  It is difficult for her to climb ladder now because of this.  She reports that occasionally, she also has dizziness when she is not on any higher altitude.  She also reports associated ringing in her ears.  In addition to this, she reports frequent occipital area headache which sometimes may radiate to involve top of her head.  She reports that the most important concern is bluish discoloration of skin involving fingers of both hands.  She reports that if she has to carry anything heavy she will end up wearing wrist brace discoloration which would lead to electrical shocklike sensation, pain that may last for few hours.  Sometimes even if she has not held anything heavy but has something in her hand despite picking up small things may lead to bruise discoloration leading to pain, paresthesias, dysesthesias lasting for 30 to 40 minutes.  The bluish discoloration takes about 48 hours to subside.  This is been going on for last several years.  In addition to this, she also reports tingling in her arms, fingers and sometimes pain.  The symptoms are particularly worse at nighttime and sometimes may wake her up in the middle of the night.  She reports that back in 1990s, she underwent EMG/nerve conduction study and she was told that she may have carpal tunnel syndrome.  She also reports having MRI  performed about 10 to 11 years ago when she saw Dr. Davis and reports that she was told that it was normal.  Her father has diagnosis of myasthenia gravis.  She also reports uncontrolled anxiety.    Follow-up: 1/12/2021: She is in clinic for regular follow-up.  Since her last visit in November, she has now done ENT consultation and was told that she has persistent, perceptual, positional dizziness.  Unfortunately, the treatment for this is either SSRI or SNRI to which she has developed some side effects in the past.  She was also prescribed Effexor on the initial visit which she could not tolerate so stopped taking it.  Luckily, she has not had any more episodes of discoloration of skin of fingers since her last visit with me.  She did some research on this and was found an article about a condition called paroxysmal hamartoma of skin which causes temporary discoloration of skin lasting for 2 to 3 days.  Based on review of this article, this is fairly benign condition without any permanent damage.  She is scheduled to have EMG/nerve conduction study in February.    The following portions of the patient's history were reviewed and updated as of 01/12/2021: allergies, social history and problem list.       Current Outpatient Medications:   •  Carboxymethylcellulose Sodium (EYE DROPS OP), Apply  to eye(s) as directed by provider., Disp: , Rfl:   •  Cetirizine HCl (ZyrTEC Allergy) 10 MG capsule, Zyrtec, Disp: , Rfl:   •  lansoprazole (PREVACID) 15 MG capsule, Take 15 mg by mouth Daily., Disp: , Rfl:    Past Medical History:   Diagnosis Date   • Costochondritis    • Depression    • Fibromyalgia    • IBS (irritable bowel syndrome)    • Muscle disorder    • Muscle pain    • Persistent postural-perceptual dizziness    • Sacro-iliac pain     joint dysfunction   • Screening breast examination     Slef; Admits   • Sjogren's disease (CMS/HCC)    • Urethral syndrome    • Vocal cord dysfunction       Past Surgical History:  "  Procedure Laterality Date   • BREAST BIOPSY Left    • COLONOSCOPY     • LAPAROSCOPIC CHOLECYSTECTOMY     • REDUCTION MAMMAPLASTY Bilateral 2014   • WISDOM TOOTH EXTRACTION        Family History   Problem Relation Age of Onset   • Breast cancer Maternal Grandmother 80   • No Known Problems Mother    • Myasthenia gravis Father    • Hypertension Father    • Arthritis Other    • Diabetes Other    • Melanoma Other    • Ovarian cancer Neg Hx         Review of Systems   Constitutional: Negative.    HENT: Negative.    Eyes: Negative.    Respiratory: Negative.    Cardiovascular: Negative.    Gastrointestinal: Negative.    Endocrine: Negative.    Genitourinary: Negative.    Musculoskeletal: Positive for neck pain.   Skin: Negative.    Allergic/Immunologic: Negative.    Neurological: Positive for numbness and headache.   Hematological: Bruises/bleeds easily.   Psychiatric/Behavioral: The patient is nervous/anxious.      Objective:    /94   Pulse 83   Temp 97.3 °F (36.3 °C)   Ht 167.6 cm (65.98\")   SpO2 99%   BMI 35.07 kg/m²     Neurology Exam:  General apperance: NAD.     Mental status: Alert, awake and oriented to time place and person.    Recent and Remote memory: Can recall 3/3 objects at 5 minutes. Can recall historical events.     Attention span and Concentration: Serial 7s: Normal.     Fund of knowledge:  Normal.     Language and Speech: No aphasia or dysarthria.    Naming , Repitition and Comprehension:  Can name objects, repeat a sentence and follow commands. Speech is clear and fluent with good repetition, comprehension, and naming.    CN II to XII: Intact.    Opthalmoscopic Exam: No papilledema.    Motor:  Right UE muscle strength 5/5. Normal tone.     Left UE muscle strength 5/5. Normal tone.      Right LE muscle strength5/5. Normal tone.     Left LE muscle strength 5/5. Normal tone.      Sensory: Normal light touch, vibration and pinprick sensation bilaterally.    DTRs: 2+ bilaterally.    Babinski: " Negative bilaterally.    Co-ordination: Normal finger-to-nose, heel to shin B/L.    Rhomberg: Negative.    Gait: Normal.    Cardiovascular: Regular rate and rhythm without murmur, gallop or rub.    Assessment and Plan:  1. Paresthesias  2. Dizziness  3. Bluish skin discoloration  ?  Paroxysmal hamartoma of skin causing loose discoloration.  Upon my personal review, this is fairly benign condition and should not be causing ongoing paresthesias that she has been experiencing.  She is going to have EMG/nerve conduction study for further evaluation and to rule out possibility of carpal tunnel syndrome or ulnar neuropathy causing her ongoing paresthesias.  Continue to follow-up with ENT as per schedule for PPPD.  The recommendation was to consider vestibular/balance therapy as she is on intolerant to a lot of SSRIs/SNRIs.  I plan to see her back in 8 weeks for follow-up.       I spent 25 minutes face to face with the patient and spent more than 50% of this time  in management, instructions and education regarding above mentioned diagnosis and also on counseling and discussing about taking medication regularly, possible side effects with medication use, importance of good sleep hygiene, good hydration and regular exercise.    Return in about 8 weeks (around 3/9/2021).

## 2021-01-20 ENCOUNTER — TELEPHONE (OUTPATIENT)
Dept: NEUROLOGY | Facility: CLINIC | Age: 51
End: 2021-01-20

## 2021-01-20 NOTE — TELEPHONE ENCOUNTER
I spoke with patient. She has been informed that EMG test does not require any pre-approval. I suggested she contacted the billing department if she is needing to know how much they would be billing her. I provided with that department number. She verbalized understanding.   -TMT

## 2021-01-20 NOTE — TELEPHONE ENCOUNTER
PT HAS CALLED TO DOUBLE CHECK INSURANCE COVERAGE FOR EMG TESTED SCHEDULED WITH DR. PATTERSON ON 2/4/21. PT STATES SHE HAS A NEW INSURANCE AND HIGH DEDUCTIBLE. SHE IS WANTING TO CLARIFY INSURANCE COVERAGE AND WHAT THE COSTS MAY BE FOLLOWING TESTING AND AFTER INSURANCE COVERAGE.     PT CAN BE REACHED AT [phone].    PLEASE REVIEW AND ADVISE.

## 2021-02-01 ENCOUNTER — OFFICE VISIT (OUTPATIENT)
Dept: FAMILY MEDICINE CLINIC | Facility: CLINIC | Age: 51
End: 2021-02-01

## 2021-02-01 VITALS
SYSTOLIC BLOOD PRESSURE: 160 MMHG | BODY MASS INDEX: 35.2 KG/M2 | HEIGHT: 66 IN | HEART RATE: 78 BPM | DIASTOLIC BLOOD PRESSURE: 100 MMHG | RESPIRATION RATE: 18 BRPM | TEMPERATURE: 98 F | WEIGHT: 219 LBS

## 2021-02-01 DIAGNOSIS — M79.10 MYALGIA: Primary | ICD-10-CM

## 2021-02-01 DIAGNOSIS — M79.604 RIGHT LEG PAIN: ICD-10-CM

## 2021-02-01 PROBLEM — M79.7 FIBROMYALGIA: Status: ACTIVE | Noted: 2020-04-24

## 2021-02-01 PROBLEM — M35.00 SJOGREN'S SYNDROME (HCC): Status: ACTIVE | Noted: 2020-04-24

## 2021-02-01 PROCEDURE — 99213 OFFICE O/P EST LOW 20 MIN: CPT | Performed by: FAMILY MEDICINE

## 2021-02-01 RX ORDER — ETODOLAC 400 MG/1
400 TABLET, FILM COATED ORAL 2 TIMES DAILY
Qty: 60 TABLET | Refills: 1 | Status: SHIPPED | OUTPATIENT
Start: 2021-02-01 | End: 2021-03-09

## 2021-02-01 NOTE — PROGRESS NOTES
Subjective   Edwige Nichols is a 50 y.o. female.     History of Present Illness     She has seen rheumatologist and neurologist for issues  She has a lot of issues with numbness in her arms and legs all the time    She has feeling in her legs and arms for a long time  It feels like sensation of metal in her veins in her arms and lefts  This in medial B knees and B antecubital fossas    Rheum though this was FM    However her right leg has been hurting worse the last 2 months on the medial aspect of her knees  Sometimes is sensitive to touch    Worse when she is sleeping on her side  No pain with ambulation nor regular movement in the day  Constant discomfort in this area    Sometimes in the tub with heat feels a little better    She had some numbness in her right foot as well  Seems like when her right knee hurts more than the right foot will hurt worse    The following portions of the patient's history were reviewed and updated as appropriate: allergies, current medications, past family history, past medical history, past social history, past surgical history and problem list.    Review of Systems   Constitutional: Negative.    Musculoskeletal: Positive for myalgias.       Objective   Physical Exam  Vitals signs and nursing note reviewed.   Constitutional:       General: She is not in acute distress.     Appearance: Normal appearance. She is well-developed.   Cardiovascular:      Rate and Rhythm: Normal rate and regular rhythm.      Heart sounds: Normal heart sounds.   Pulmonary:      Effort: Pulmonary effort is normal.      Breath sounds: Normal breath sounds.   Musculoskeletal:        Legs:    Neurological:      Mental Status: She is alert and oriented to person, place, and time.   Psychiatric:         Mood and Affect: Mood normal.         Behavior: Behavior normal.         Thought Content: Thought content normal.         Judgment: Judgment normal.         Assessment/Plan   Diagnoses and all orders for this  visit:    1. Myalgia (Primary)  -     etodolac (LODINE) 400 MG tablet; Take 1 tablet by mouth 2 (Two) Times a Day.  Dispense: 60 tablet; Refill: 1    2. Right leg pain    lodine BID  Consider neurontin in the future at low dose  Also consider PMR in the future if nothing else works

## 2021-02-04 ENCOUNTER — PROCEDURE VISIT (OUTPATIENT)
Dept: NEUROLOGY | Facility: CLINIC | Age: 51
End: 2021-02-04

## 2021-02-04 DIAGNOSIS — R20.2 PARESTHESIAS: ICD-10-CM

## 2021-02-04 DIAGNOSIS — G56.03 BILATERAL CARPAL TUNNEL SYNDROME: Primary | ICD-10-CM

## 2021-02-04 PROCEDURE — 95886 MUSC TEST DONE W/N TEST COMP: CPT | Performed by: PSYCHIATRY & NEUROLOGY

## 2021-02-04 PROCEDURE — 95911 NRV CNDJ TEST 9-10 STUDIES: CPT | Performed by: PSYCHIATRY & NEUROLOGY

## 2021-02-04 NOTE — PROGRESS NOTES
[facility], [place] [facility] [place]   Electrodiagnostic Laboratory    Nerve Conduction & EMG Report        Patient:  Edwige Nichols   Patient ID: [id number]   YOB: 1970  Sex:  female      Exam Physician:  James Aldana MD  Refer Physician:  Tian Vieira MD    Electromyogram and Nerve Conduction Velocity Procedure Note    Hx: 50 y.o. right handed female with complaint of numbness involving the the upper extremities. Symptoms have been present for several months and were provoked by no clear event. Significant past medical history includes nothing suggestive of neuropathy.  Family history no family history of nerve or muscle disease.    Exam: Motor power is normal. There is no atrophy. There are no fasciculations. Deep tendon reflexes are present and symmetrical. Sensory exam is normal.      Edx studies of the B UE were performed to evaluate for carpal tunnel syndrome.      NCS Examination   For sensory nerve conduction studies, the amplitude is measured peak-to-peak, the latency reported is the distal peak latency, and the conduction velocity, if measured, is determined from onset latencies and is over the forearm.   For motor nerve conduction studies, the amplitude is measured baseline-to-peak, the latency reported is the distal onset latency, the conduction velocity is calculated over the forearm, and the F wave latency is the minimum latency.   Unless otherwise noted, the hand temperature was monitored continuously and remained between 32°C and 36°C during the performance of the NCSs.      Sensory NCS      Nerve / Sites Rec. Site Onset Lat Peak Lat NP Amp PP Amp Segments Distance Velocity     ms ms µV µV  cm m/s   L MEDIAN - Dig II Antidr      Wrist Dig II 3.05 3.95 56.4 79.8 Wrist - Dig II 14 45.9      Ref.   3.70 20.0  Ref.     R MEDIAN - Dig II Antidr      Wrist Dig II 3.35 4.20 51.0 98.4 Wrist - Dig II 14 41.8      Ref.   3.70 20.0  Ref.     L ULNAR - Dig V Antidr      Wrist Dig V 2.45 3.30 77.8  40.0 Wrist - Dig V 14 57.1      Ref.   3.70 10.0  Ref.     R ULNAR - Dig V Antidr      Wrist Dig V 2.85 3.75 46.9 79.4 Wrist - Dig V 14 49.1      Ref.   3.70 10.0  Ref.     L RADIAL - Thumb Antidr      Forearm Thumb 1.10 1.75 53.8 24.7 Forearm - Thumb 10 90.9      Ref.   3.00 8.0  Ref.     R RADIAL - Thumb Antidr      Forearm Thumb 1.25 1.75 10.0 16.0 Forearm - Thumb 10 80.0      Ref.   3.00 8.0  Ref.                     Motor NCS      Nerve / Sites Rec. Site Lat Amp Seq Amp Segments Dist Velocity     ms mV %  cm m/s   L MEDIAN - APB      Wrist APB 4.05 7.6 100 Wrist - APB 8       Ref.  4.40 4.0  Ref.        Elbow APB 7.80 6.5 86.4 Elbow - Wrist 20 53.3      Ref.     Ref.  49.0   R MEDIAN - APB      Wrist APB 5.70 6.6 100 Wrist - APB 8       Ref.  4.40 4.0  Ref.        Elbow APB 9.50 6.3 95.4 Elbow - Wrist 21 55.3      Ref.     Ref.  49.0   L ULNAR - ADM      Wrist ADM 2.45 9.8 100 Wrist - ADM 8       Ref.  3.90 5.0  Ref.        B.Elbow ADM 6.20 8.3 84.6 B.Elbow - Wrist 18 48.0      Ref.     Ref.  49.0      A.Elbow ADM 7.40 8.8 105 A.Elbow - B.Elbow 9 75.0   R ULNAR - ADM      Wrist ADM 2.70 9.5 100 Wrist - ADM 8       Ref.  3.90 5.0  Ref.        B.Elbow ADM 6.05 8.3 86.9 B.Elbow - Wrist 21 62.7      Ref.     Ref.  49.0      A.Elbow ADM 7.25 7.1 85.3 A.Elbow - B.Elbow 9 75.0               F  Wave      Nerve Fmin    ms   L MEDIAN 26.00   REF 33.00   L ULNAR 26.25   REF 33.00   R MEDIAN 26.85   REF 33.00   R ULNAR 25.10   REF 33.00                   EMG Examination   The study was performed with a concentric needle electrode. Fibrillation and fasciculation activity is graded from none (0) to continuous (4+). The configuration and recruitment pattern of motor unit action potentials under voluntary control, if not normal, are described bel      EMG Summary Table     Spontaneous MUAP Recruitment    IA Fib PSW Fasc H.F. Amp Dur. PPP Pattern   L. DELTOID N None None None None N N N N   L. BICEPS N None None None None N N N  N   L. TRICEPS N None None None None N N N N   L. PRON TERES N None None None None N N N N   L. FIRST D INTEROSS N None None None None N N N N   L. ABD POLL BREVIS N None None None None N N N N   R. DELTOID N None None None None N N N N   R. BICEPS N None None None None N N N N   R. TRICEPS N None None None None N N N N   R. PRON TERES N None None None None N N N N   R. FIRST D INTEROSS N None None None None N N N N   R. ABD POLL BREVIS N None None None None N N N N           · Right median motor responses revealed prolonged latency across the wrist and F waves were normal.  · Right median sensory responses revealed prolonged distal and peak latency  · Right ulnar motor responses and F wave were normal.  · Right ulnar sensory responses revealed prolonged peak latency   · Right ulnar sensory responses were normal  · Left median motor responses and F waves were normal.  · Left median sensory responses revealed prolonged distal and peak latency  · Left ulnar motor responses and F wave were normal.  · Left ulnar sensory responses were normal  · Left radial sensory responses were normal  · Needle examination with a concentric needle electrode of selected muscles of the bilateral upper extremities were normal             Conclusion: This study showed neurophysiologic evidence of a median mononeuropathy of the bilateral wrists. This would be consistent with the clinical diagnosis of carpal tunnel syndrome, mild right, minimal left.           Instrument used:  Teca Synergy        Performed by:          James Aldana MD

## 2021-03-09 ENCOUNTER — LAB (OUTPATIENT)
Dept: LAB | Facility: HOSPITAL | Age: 51
End: 2021-03-09

## 2021-03-09 ENCOUNTER — OFFICE VISIT (OUTPATIENT)
Dept: NEUROLOGY | Facility: CLINIC | Age: 51
End: 2021-03-09

## 2021-03-09 VITALS
TEMPERATURE: 98.2 F | HEIGHT: 66 IN | BODY MASS INDEX: 35.36 KG/M2 | SYSTOLIC BLOOD PRESSURE: 164 MMHG | DIASTOLIC BLOOD PRESSURE: 102 MMHG | OXYGEN SATURATION: 100 % | HEART RATE: 98 BPM

## 2021-03-09 DIAGNOSIS — R23.0 BLUISH SKIN DISCOLORATION: ICD-10-CM

## 2021-03-09 DIAGNOSIS — M79.10 MYALGIA: ICD-10-CM

## 2021-03-09 DIAGNOSIS — R42 DIZZINESS: ICD-10-CM
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
## 2021-03-09 DIAGNOSIS — G43.009 MIGRAINE WITHOUT AURA AND WITHOUT STATUS MIGRAINOSUS, NOT INTRACTABLE: ICD-10-CM

## 2021-03-09 DIAGNOSIS — G56.03 BILATERAL CARPAL TUNNEL SYNDROME: ICD-10-CM

## 2021-03-09 DIAGNOSIS — R20.2 PARESTHESIAS: Primary | ICD-10-CM

## 2021-03-09 PROCEDURE — 36415 COLL VENOUS BLD VENIPUNCTURE: CPT

## 2021-03-09 PROCEDURE — 99215 OFFICE O/P EST HI 40 MIN: CPT | Performed by: PSYCHIATRY & NEUROLOGY

## 2021-03-09 PROCEDURE — 86617 LYME DISEASE ANTIBODY: CPT

## 2021-03-09 RX ORDER — PREGABALIN 50 MG/1
50 CAPSULE ORAL NIGHTLY
Qty: 30 CAPSULE | Refills: 3 | Status: SHIPPED | OUTPATIENT
Start: 2021-03-09 | End: 2021-06-10

## 2021-03-09 NOTE — PROGRESS NOTES
Subjective:    CC: Edwige Nichols is in clinic today for follow up for      HPI:  Patient is a 50-year-old female with past medical history of  Sjogren's syndrome, vocal cord dysfunction, IBS, fibromyalgia referred to the clinic for episodic dizziness, paresthesias in both the hands and arms as well as bluish discoloration involving the skin of fingers of both hands.  She reports that dizzy spells started about 5 years ago and she initially noted that whenever she is on any form of height, the dizziness is heightened.  It is difficult for her to climb ladder now because of this.  She reports that occasionally, she also has dizziness when she is not on any higher altitude.  She also reports associated ringing in her ears.  In addition to this, she reports frequent occipital area headache which sometimes may radiate to involve top of her head.  She reports that the most important concern is bluish discoloration of skin involving fingers of both hands.  She reports that if she has to carry anything heavy she will end up wearing wrist brace discoloration which would lead to electrical shocklike sensation, pain that may last for few hours.  Sometimes even if she has not held anything heavy but has something in her hand despite picking up small things may lead to bruise discoloration leading to pain, paresthesias, dysesthesias lasting for 30 to 40 minutes.  The bluish discoloration takes about 48 hours to subside.  This is been going on for last several years.  In addition to this, she also reports tingling in her arms, fingers and sometimes pain.  The symptoms are particularly worse at nighttime and sometimes may wake her up in the middle of the night.  She reports that back in 1990s, she underwent EMG/nerve conduction study and she was told that she may have carpal tunnel syndrome.  She also reports having MRI performed about 10 to 11 years ago when she saw Dr. Davis and reports that she was told that it was normal.   Her father has diagnosis of myasthenia gravis.  She also reports uncontrolled anxiety.    Follow-up: 1/12/2021: She is in clinic for regular follow-up.  Since her last visit in November, she has now done ENT consultation and was told that she has persistent, perceptual, positional dizziness.  Unfortunately, the treatment for this is either SSRI or SNRI to which she has developed some side effects in the past.  She was also prescribed Effexor on the initial visit which she could not tolerate so stopped taking it.  Luckily, she has not had any more episodes of discoloration of skin of fingers since her last visit with me.  She did some research on this and was found an article about a condition called paroxysmal hamartoma of skin which causes temporary discoloration of skin lasting for 2 to 3 days.  Based on review of this article, this is fairly benign condition without any permanent damage.  She is scheduled to have EMG/nerve conduction study in February.    Follow-up: 3/9/2021: She is in clinic for regular follow-up.  Since her last visit, she reports that she has not had any more episodes of bluish discoloration of skin of her fingers however she has noted episodes of muscle pain involving the medial aspect of her both her legs and both her forearms occurring intermittently.  One episode in February was really intense lasting for few hours.  Following this, she was concerned about possibility of DVT and saw Dr. Cordoba in follow-up.  After his evaluation, she was reassured that it was extremely less likely to be caused by DVT.  She likely has not had any more intense episodes of pain in these areas.  However she continues to have episodes of mild pain.  There is no discoloration of skin, swelling or temperature change when she has these episodes.  She also completed EMG/nerve conduction study which I reviewed personally and it showed evidence of mild carpal tunnel syndrome on right and minimal on the left.  Today  she also reports that in the past, she has found a lot of deer tics on her skin and she would remove them.  She denies any target lesions or any rash and is concerned about possibility of long-term effects of untreated Lyme's disease.  She is also worried about possibility of multiple sclerosis causing the symptoms.    The following portions of the patient's history were reviewed and updated as of 03/09/2021: allergies, social history and problem list.       Current Outpatient Medications:   •  Carboxymethylcellulose Sodium (EYE DROPS OP), Apply  to eye(s) as directed by provider., Disp: , Rfl:   •  Cetirizine HCl (ZyrTEC Allergy) 10 MG capsule, Zyrtec, Disp: , Rfl:   •  lansoprazole (PREVACID) 15 MG capsule, Take 15 mg by mouth Daily., Disp: , Rfl:   •  pregabalin (Lyrica) 50 MG capsule, Take 1 capsule by mouth Every Night., Disp: 30 capsule, Rfl: 3   Past Medical History:   Diagnosis Date   • Costochondritis    • Depression    • Fibromyalgia    • IBS (irritable bowel syndrome)    • Muscle disorder    • Muscle pain    • Persistent postural-perceptual dizziness    • Sacro-iliac pain     joint dysfunction   • Screening breast examination     Slef; Admits   • Sjogren's disease (CMS/HCC)    • Urethral syndrome    • Vocal cord dysfunction       Past Surgical History:   Procedure Laterality Date   • BREAST BIOPSY Left    • COLONOSCOPY     • LAPAROSCOPIC CHOLECYSTECTOMY     • REDUCTION MAMMAPLASTY Bilateral 2014   • WISDOM TOOTH EXTRACTION        Family History   Problem Relation Age of Onset   • Breast cancer Maternal Grandmother 80   • No Known Problems Mother    • Myasthenia gravis Father    • Hypertension Father    • Arthritis Other    • Diabetes Other    • Melanoma Other    • Ovarian cancer Neg Hx         Review of Systems   Constitutional: Negative.    HENT: Negative.    Eyes: Negative.    Respiratory: Negative.    Cardiovascular: Negative.    Gastrointestinal: Negative.    Endocrine: Negative.    Genitourinary:  "Negative.    Musculoskeletal: Negative.    Allergic/Immunologic: Negative.    Neurological: Negative.    Hematological: Negative.    Psychiatric/Behavioral: The patient is nervous/anxious.      Objective:    BP (!) 164/102   Pulse 98   Temp 98.2 °F (36.8 °C)   Ht 167.6 cm (65.98\")   SpO2 100%   BMI 35.36 kg/m²     Neurology Exam:  General apperance: NAD.     Mental status: Alert, awake and oriented to time place and person.    Recent and Remote memory: Can recall 3/3 objects at 5 minutes. Can recall historical events.     Attention span and Concentration: Serial 7s: Normal.     Fund of knowledge:  Normal.     Language and Speech: No aphasia or dysarthria.    Naming , Repitition and Comprehension:  Can name objects, repeat a sentence and follow commands. Speech is clear and fluent with good repetition, comprehension, and naming.    CN II to XII: Intact.    Opthalmoscopic Exam: No papilledema.    Motor:  Right UE muscle strength 5/5. Normal tone.     Left UE muscle strength 5/5. Normal tone.      Right LE muscle strength5/5. Normal tone.     Left LE muscle strength 5/5. Normal tone.      Sensory: Normal light touch, vibration and pinprick sensation bilaterally.    DTRs: 2+ bilaterally.    Babinski: Negative bilaterally.    Co-ordination: Normal finger-to-nose, heel to shin B/L.    Rhomberg: Negative.    Gait: Normal.    Cardiovascular: Regular rate and rhythm without murmur, gallop or rub.    Assessment and Plan:  1. Paresthesias  2. Bluish skin discoloration  3. Bilateral carpal tunnel syndrome  4. Migraine without aura and without status migrainosus, not intractable  5. Tick bite, subsequent encounter  6. Dizziness  7. Myalgia  -EMG/nerve conduction study showed mild carpal tunnel syndrome on the right and minimal on the left.  I have advised her to start wearing wrist brace at night and see if it improves some of the symptoms.  The most recent symptoms of pain involving the medial aspect of bilateral forearms " and bilateral legs may be caused by ? fibromyalgia.  I have reassured her that this is not coming from DVT.  Since she is reporting possible tick bite in the past, I am going to order Lyme's disease titer for further evaluation.  We will also consider MRI brain to rule out any intracranial abnormality causing her ongoing symptoms.  She will be contacting my office when she finds out the place where she needs to get MRI based on her insurance and then it will be ordered.  I will be starting her on low-dose Lyrica 50 mg at bedtime and see how she does if it helps then it will be continued or based on the response, further dose adjustment will be made.  I will plan to see her back in 3 months for follow-up.       I spent 40 minutes face to face with the patient and spent more than 50% of this time  in management, instructions and education regarding above mentioned diagnosis and also on counseling and discussing about taking medication regularly, possible side effects with medication use, importance of good sleep hygiene, good hydration and regular exercise.    Return in about 3 months (around 6/9/2021).

## 2021-03-13 LAB

## 2021-03-17 ENCOUNTER — TELEPHONE (OUTPATIENT)
Dept: NEUROLOGY | Facility: CLINIC | Age: 51
End: 2021-03-17

## 2021-03-17 DIAGNOSIS — R20.2 PARESTHESIAS: Primary | ICD-10-CM

## 2021-03-17 NOTE — TELEPHONE ENCOUNTER
Provider: STEPHANIE  Caller: HOOD TORRES  Relationship to Patient: N/A  Phone Number: [phone]  FAX NUMBER: [phone]  Reason for Call: WILL NEED A COPY OF THE MRI ORDERS TO SCHEDULE SCANS.    PLEASE CALL & ADVISE.    THANK YOU.

## 2021-03-18 NOTE — TELEPHONE ENCOUNTER
Order Request    Patient Name:  Edwige moya  : 1970  Caller: SELF      Order requested: MRI MRI Brain Without Contrast     Reason for request: PT CALLING STATING THAT [facility] NOT RECEIV[facility] THE ORDER FOR THE MRI. PLEASE REFAX -[phone] ATTN: GABI ASHLEY Phone Number: [phone]    Best call back number:  [phone]

## 2021-03-18 NOTE — TELEPHONE ENCOUNTER
Order faxed to number provided below  [phone] ATTN: Boni, per request. Fax successful.   -TMT

## 2021-03-19 ENCOUNTER — HOSPITAL ENCOUNTER (OUTPATIENT)
Dept: MAMMOGRAPHY | Facility: HOSPITAL | Age: 51
Discharge: HOME | End: 2021-03-19
Admitting: OBSTETRICS & GYNECOLOGY

## 2021-03-19 DIAGNOSIS — Z12.31 VISIT FOR SCREENING MAMMOGRAM: ICD-10-CM

## 2021-03-19 PROCEDURE — 77067 SCR MAMMO BI INCL CAD: CPT | Performed by: RADIOLOGY

## 2021-03-19 PROCEDURE — 77067 SCR MAMMO BI INCL CAD: CPT

## 2021-03-19 PROCEDURE — 77063 BREAST TOMOSYNTHESIS BI: CPT | Performed by: RADIOLOGY

## 2021-03-19 PROCEDURE — 77063 BREAST TOMOSYNTHESIS BI: CPT

## 2021-04-02 ENCOUNTER — OFFICE VISIT (OUTPATIENT)
Dept: FAMILY MEDICINE CLINIC | Facility: CLINIC | Age: 51
End: 2021-04-02

## 2021-04-02 VITALS
WEIGHT: 223 LBS | SYSTOLIC BLOOD PRESSURE: 150 MMHG | DIASTOLIC BLOOD PRESSURE: 80 MMHG | HEART RATE: 84 BPM | RESPIRATION RATE: 18 BRPM | HEIGHT: 66 IN | TEMPERATURE: 97.8 F | BODY MASS INDEX: 35.84 KG/M2

## 2021-04-02 DIAGNOSIS — N30.01 ACUTE CYSTITIS WITH HEMATURIA: Primary | ICD-10-CM

## 2021-04-02 PROBLEM — E55.9 VITAMIN D DEFICIENCY: Status: ACTIVE | Noted: 2021-04-02

## 2021-04-02 PROBLEM — E53.8 B12 DEFICIENCY: Status: ACTIVE | Noted: 2021-04-02

## 2021-04-02 LAB
BILIRUB BLD-MCNC: NEGATIVE MG/DL
CLARITY, POC: CLEAR
COLOR UR: YELLOW
GLUCOSE UR STRIP-MCNC: NEGATIVE MG/DL
KETONES UR QL: NEGATIVE
LEUKOCYTE EST, POC: NEGATIVE
NITRITE UR-MCNC: NEGATIVE MG/ML
PH UR: 6 [PH] (ref 5–8)
PROT UR STRIP-MCNC: NEGATIVE MG/DL
RBC # UR STRIP: ABNORMAL /UL
SP GR UR: 1.03 (ref 1–1.03)
UROBILINOGEN UR QL: NORMAL

## 2021-04-02 PROCEDURE — 99213 OFFICE O/P EST LOW 20 MIN: CPT | Performed by: FAMILY MEDICINE

## 2021-04-02 PROCEDURE — 81003 URINALYSIS AUTO W/O SCOPE: CPT | Performed by: FAMILY MEDICINE

## 2021-04-02 RX ORDER — ERGOCALCIFEROL 1.25 MG/1
50000 CAPSULE ORAL WEEKLY
COMMUNITY

## 2021-04-02 RX ORDER — CELECOXIB 200 MG/1
200 CAPSULE ORAL DAILY
COMMUNITY
End: 2022-10-19

## 2021-04-02 RX ORDER — PYRIDOXINE HCL (VITAMIN B6) 50 MG
100 TABLET ORAL DAILY
COMMUNITY
End: 2021-06-10

## 2021-04-02 RX ORDER — CIPROFLOXACIN 500 MG/1
500 TABLET, FILM COATED ORAL 2 TIMES DAILY
Qty: 14 TABLET | Refills: 0 | Status: SHIPPED | OUTPATIENT
Start: 2021-04-02 | End: 2021-05-25

## 2021-04-02 NOTE — PROGRESS NOTES
Subjective   Edwige Nichols is a 50 y.o. female.     History of Present Illness     She has had dysuria the past week  This is getting worse  Minor frequency and urgency as well  No fever, no N/V    Pt has seen Dr. Maher in the past (urology)  Used vesicare for a while that helped this as well      Review of Systems   Constitutional: Negative.        Objective   Physical Exam  Vitals and nursing note reviewed.   Constitutional:       General: She is not in acute distress.     Appearance: Normal appearance. She is well-developed.   Cardiovascular:      Rate and Rhythm: Normal rate and regular rhythm.      Heart sounds: Normal heart sounds.   Pulmonary:      Effort: Pulmonary effort is normal.      Breath sounds: Normal breath sounds.   Abdominal:      General: Abdomen is flat. Bowel sounds are normal. There is no distension.      Palpations: Abdomen is soft.      Tenderness: There is no abdominal tenderness. There is no guarding or rebound.   Neurological:      Mental Status: She is alert and oriented to person, place, and time.   Psychiatric:         Mood and Affect: Mood normal.         Behavior: Behavior normal.         Thought Content: Thought content normal.         Judgment: Judgment normal.         Assessment/Plan   Diagnoses and all orders for this visit:    1. Acute cystitis with hematuria (Primary)  -     POCT urinalysis dipstick, automated  -     Urine Culture - , Urine, Clean Catch  -     ciprofloxacin (Cipro) 500 MG tablet; Take 1 tablet by mouth 2 (Two) Times a Day.  Dispense: 14 tablet; Refill: 0    will check urine culture and treat with cipro  Consider resumption of vesicare if these symptoms continue.

## 2021-04-05 LAB
BACTERIA UR CULT: ABNORMAL
OTHER ANTIBIOTIC SUSC ISLT: ABNORMAL

## 2021-04-09 ENCOUNTER — TELEPHONE (OUTPATIENT)
Dept: FAMILY MEDICINE CLINIC | Facility: CLINIC | Age: 51
End: 2021-04-09

## 2021-04-26 ENCOUNTER — TELEPHONE (OUTPATIENT)
Dept: NEUROLOGY | Facility: CLINIC | Age: 51
End: 2021-04-26

## 2021-04-26 ENCOUNTER — OFFICE VISIT (OUTPATIENT)
Dept: FAMILY MEDICINE CLINIC | Facility: CLINIC | Age: 51
End: 2021-04-26

## 2021-04-26 VITALS
HEART RATE: 76 BPM | BODY MASS INDEX: 35.36 KG/M2 | WEIGHT: 220 LBS | HEIGHT: 66 IN | TEMPERATURE: 97.8 F | RESPIRATION RATE: 18 BRPM | SYSTOLIC BLOOD PRESSURE: 138 MMHG | DIASTOLIC BLOOD PRESSURE: 90 MMHG

## 2021-04-26 DIAGNOSIS — N30.00 ACUTE CYSTITIS WITHOUT HEMATURIA: ICD-10-CM

## 2021-04-26 DIAGNOSIS — M62.89 PELVIC FLOOR DYSFUNCTION: Primary | ICD-10-CM

## 2021-04-26 PROCEDURE — 99213 OFFICE O/P EST LOW 20 MIN: CPT | Performed by: FAMILY MEDICINE

## 2021-04-26 RX ORDER — SOLIFENACIN SUCCINATE 10 MG/1
10 TABLET, FILM COATED ORAL DAILY
Qty: 30 TABLET | Refills: 2 | Status: SHIPPED | OUTPATIENT
Start: 2021-04-26 | End: 2022-10-19

## 2021-04-26 NOTE — TELEPHONE ENCOUNTER
Caller: Edwige Nichols    Relationship: Self    Best call back number: [phone]    Caller requesting test results:YES    What test was performed: MRI BRAIN      When was the test performed: 03-25-21    Where was the test performed: OUT PT. AT [facility]. HOSP    Additional notes: PT CALLED WANTS TO KNOW THE RESULTS OF MRI  AND MAKE SURE YOU WILL SEND RESULTS TO ENT DR CHITO WASHINGTON [phone]     PLEASE ADVISE

## 2021-04-26 NOTE — PROGRESS NOTES
Subjective   Edwige Nichols is a 50 y.o. female.     History of Present Illness     She had a UTI in April  She took cipro but never felt better  She has had more urinary frequency and pelvic floor symptoms again  Having urgency and frequency  Minor pelvic floor pains    She has been doing treatments as prescribed from Dr. Maher in the past      Review of Systems   Constitutional: Negative.        Objective   Physical Exam  Vitals and nursing note reviewed.   Constitutional:       General: She is not in acute distress.     Appearance: Normal appearance. She is well-developed.   Cardiovascular:      Rate and Rhythm: Normal rate and regular rhythm.      Heart sounds: Normal heart sounds.   Pulmonary:      Effort: Pulmonary effort is normal.      Breath sounds: Normal breath sounds.   Neurological:      Mental Status: She is alert and oriented to person, place, and time.   Psychiatric:         Mood and Affect: Mood normal.         Behavior: Behavior normal.         Thought Content: Thought content normal.         Judgment: Judgment normal.         Assessment/Plan   Diagnoses and all orders for this visit:    1. Pelvic floor dysfunction (Primary)  -     solifenacin (VESIcare) 10 MG tablet; Take 1 tablet by mouth Daily.  Dispense: 30 tablet; Refill: 2    2. Acute cystitis without hematuria  -     Urine Culture - , Urine, Clean Catch    will resume vesicare for Hx of pelvic floor dysfunction and recheck urine culture.  We will call her for urine culture results

## 2021-04-28 LAB
BACTERIA UR CULT: NORMAL
BACTERIA UR CULT: NORMAL

## 2021-05-25 ENCOUNTER — OFFICE VISIT (OUTPATIENT)
Dept: FAMILY MEDICINE CLINIC | Facility: CLINIC | Age: 51
End: 2021-05-25

## 2021-05-25 VITALS
BODY MASS INDEX: 35.2 KG/M2 | RESPIRATION RATE: 18 BRPM | HEART RATE: 80 BPM | HEIGHT: 66 IN | DIASTOLIC BLOOD PRESSURE: 84 MMHG | WEIGHT: 219 LBS | SYSTOLIC BLOOD PRESSURE: 126 MMHG | TEMPERATURE: 98 F

## 2021-05-25 DIAGNOSIS — R30.0 DYSURIA: Primary | ICD-10-CM

## 2021-05-25 PROCEDURE — 99213 OFFICE O/P EST LOW 20 MIN: CPT | Performed by: NURSE PRACTITIONER

## 2021-05-25 NOTE — PROGRESS NOTES
"Chief Complaint  Difficulty Urinating (burning with urination. Has been on vesicare for a month but yesterday started having burning. That is abnormal. Also feel more frequency. Having some back pain.)    Subjective          Edwige Nichols presents to [facility]  History of Present Illness     Burning with urination started yesterday  No previous UTI except April   Taking Vesicare for the past month, low back pain  Has seen Dr Lloyd Urologist in the past.  Dr Andrade diagnosed with Pelvic floor disorder standing for long time makes symptoms worse  Wants to make sure she does not have a UTI  Drinking a lot of water   Objective   Vital Signs:   /84   Pulse 80   Temp 98 °F (36.7 °C)   Resp 18   Ht 167.6 cm (66\")   Wt 99.3 kg (219 lb)   BMI 35.35 kg/m²     Physical Exam  Vitals and nursing note reviewed.   Constitutional:       General: She is not in acute distress.     Appearance: Normal appearance. She is well-developed. She is not diaphoretic.   HENT:      Head: Normocephalic.      Nose: Nose normal.   Cardiovascular:      Rate and Rhythm: Normal rate and regular rhythm.      Heart sounds: Normal heart sounds.   Pulmonary:      Effort: Pulmonary effort is normal.      Breath sounds: Normal breath sounds.   Abdominal:      General: Bowel sounds are normal. There is no distension.      Palpations: Abdomen is soft. There is no mass.      Tenderness: There is no abdominal tenderness. There is no right CVA tenderness, left CVA tenderness, guarding or rebound.      Hernia: No hernia is present.   Musculoskeletal:      Cervical back: Neck supple.   Skin:     General: Skin is warm and dry.   Neurological:      Mental Status: She is alert and oriented to person, place, and time.   Psychiatric:         Mood and Affect: Mood normal.         Behavior: Behavior normal.         Thought Content: Thought content normal.         Judgment: Judgment normal.        Result Review :           "       Assessment and Plan    Diagnoses and all orders for this visit:    1. Dysuria (Primary)  -     Urine Culture - Urine, Urine, Clean Catch        Follow Up   Return if symptoms worsen or fail to improve.  Patient was given instructions and counseling regarding her condition or for health maintenance advice. Please see specific information pulled into the AVS if appropriate.     UA negative for leukocytes or Nitrites; + small blood  Will send for urine culture

## 2021-05-26 ENCOUNTER — TELEPHONE (OUTPATIENT)
Dept: FAMILY MEDICINE CLINIC | Facility: CLINIC | Age: 51
End: 2021-05-26

## 2021-05-26 NOTE — TELEPHONE ENCOUNTER
Patient can take Preleif OTC medication that can help with urinary pain until the urine culture is back. jayc

## 2021-05-26 NOTE — TELEPHONE ENCOUNTER
PT CALLED TO REQUEST AN ANTIBIOTIC, PT STATED THAT SHE KNOWS THAT HER URINE RESULTS FROM YESTERDAY HAVE NOT CAME IN YET BUT SHE IS IN A LOT OF PAIN, BURNING, CAN NOT SLEEP BECAUSE OF PAIN.    PLEASE ADVISE.  CALL BACK:[phone]    SAMRA BERMUDEZ [address] [place] [address] - [address] [facility] AT [facility]

## 2021-05-27 LAB
BACTERIA UR CULT: NORMAL
BACTERIA UR CULT: NORMAL

## 2021-06-10 ENCOUNTER — OFFICE VISIT (OUTPATIENT)
Dept: NEUROLOGY | Facility: CLINIC | Age: 51
End: 2021-06-10

## 2021-06-10 VITALS
TEMPERATURE: 96.8 F | WEIGHT: 220 LBS | HEIGHT: 66 IN | BODY MASS INDEX: 35.36 KG/M2 | OXYGEN SATURATION: 98 % | HEART RATE: 78 BPM

## 2021-06-10 DIAGNOSIS — R23.0 BLUISH SKIN DISCOLORATION: ICD-10-CM

## 2021-06-10 DIAGNOSIS — R20.2 PARESTHESIAS: Primary | ICD-10-CM

## 2021-06-10 DIAGNOSIS — M79.10 MYALGIA: ICD-10-CM

## 2021-06-10 PROCEDURE — 99214 OFFICE O/P EST MOD 30 MIN: CPT | Performed by: PSYCHIATRY & NEUROLOGY

## 2021-06-10 NOTE — PROGRESS NOTES
Subjective:    CC: Edwige Nichols is in clinic today for follow up for episodic paresthesias, myalgia, skin discoloration.    HPI:  Patient is a 50-year-old female with past medical history of  Sjogren's syndrome, vocal cord dysfunction, IBS, fibromyalgia referred to the clinic for episodic dizziness, paresthesias in both the hands and arms as well as bluish discoloration involving the skin of fingers of both hands.  She reports that dizzy spells started about 5 years ago and she initially noted that whenever she is on any form of height, the dizziness is heightened.  It is difficult for her to climb ladder now because of this.  She reports that occasionally, she also has dizziness when she is not on any higher altitude.  She also reports associated ringing in her ears.  In addition to this, she reports frequent occipital area headache which sometimes may radiate to involve top of her head.  She reports that the most important concern is bluish discoloration of skin involving fingers of both hands.  She reports that if she has to carry anything heavy she will end up wearing wrist brace discoloration which would lead to electrical shocklike sensation, pain that may last for few hours.  Sometimes even if she has not held anything heavy but has something in her hand despite picking up small things may lead to bruise discoloration leading to pain, paresthesias, dysesthesias lasting for 30 to 40 minutes.  The bluish discoloration takes about 48 hours to subside.  This is been going on for last several years.  In addition to this, she also reports tingling in her arms, fingers and sometimes pain.  The symptoms are particularly worse at nighttime and sometimes may wake her up in the middle of the night.  She reports that back in 1990s, she underwent EMG/nerve conduction study and she was told that she may have carpal tunnel syndrome.  She also reports having MRI performed about 10 to 11 years ago when she saw Dr. Davis  and reports that she was told that it was normal.  Her father has diagnosis of myasthenia gravis.  She also reports uncontrolled anxiety.    Follow-up: 1/12/2021: She is in clinic for regular follow-up.  Since her last visit in November, she has now done ENT consultation and was told that she has persistent, perceptual, positional dizziness.  Unfortunately, the treatment for this is either SSRI or SNRI to which she has developed some side effects in the past.  She was also prescribed Effexor on the initial visit which she could not tolerate so stopped taking it.  Luckily, she has not had any more episodes of discoloration of skin of fingers since her last visit with me.  She did some research on this and was found an article about a condition called paroxysmal hamartoma of skin which causes temporary discoloration of skin lasting for 2 to 3 days.  Based on review of this article, this is fairly benign condition without any permanent damage.  She is scheduled to have EMG/nerve conduction study in February.    Follow-up: 3/9/2021: She is in clinic for regular follow-up.  Since her last visit, she reports that she has not had any more episodes of bluish discoloration of skin of her fingers however she has noted episodes of muscle pain involving the medial aspect of her both her legs and both her forearms occurring intermittently.  One episode in February was really intense lasting for few hours.  Following this, she was concerned about possibility of DVT and saw Dr. Cordoba in follow-up.  After his evaluation, she was reassured that it was extremely less likely to be caused by DVT.  She likely has not had any more intense episodes of pain in these areas.  However she continues to have episodes of mild pain.  There is no discoloration of skin, swelling or temperature change when she has these episodes.  She also completed EMG/nerve conduction study which I reviewed personally and it showed evidence of mild carpal tunnel  syndrome on right and minimal on the left.  Today she also reports that in the past, she has found a lot of deer tics on her skin and she would remove them.  She denies any target lesions or any rash and is concerned about possibility of long-term effects of untreated Lyme's disease.  She is also worried about possibility of multiple sclerosis causing the symptoms.    Follow-up: 6/10/2021: She is in clinic for regular follow-up.  Since her last visit in March, she reports that she was found to have very low vitamin D levels and was started on aggressive vitamin D replacement.  Since starting that, she has had excellent response and almost complete resolution of all the symptoms that she was experiencing including myalgias, paresthesias and skin discoloration.  She is overall feeling much better.  She also completed MRI brain which I reviewed personally and did not reveal any evidence of MS or white matter abnormality.    The following portions of the patient's history were reviewed and updated as of 06/10/2021: allergies, social history and problem list.       Current Outpatient Medications:   •  Carboxymethylcellulose Sodium (EYE DROPS OP), Apply  to eye(s) as directed by provider., Disp: , Rfl:   •  celecoxib (CeleBREX) 200 MG capsule, Take 200 mg by mouth Daily., Disp: , Rfl:   •  Cetirizine HCl (ZyrTEC Allergy) 10 MG capsule, Zyrtec, Disp: , Rfl:   •  lansoprazole (PREVACID) 15 MG capsule, Take 15 mg by mouth Daily., Disp: , Rfl:   •  solifenacin (VESIcare) 10 MG tablet, Take 1 tablet by mouth Daily., Disp: 30 tablet, Rfl: 2  •  vitamin D (ERGOCALCIFEROL) 1.25 MG (22203 UT) capsule capsule, Take 50,000 Units by mouth 1 (One) Time Per Week., Disp: , Rfl:    Past Medical History:   Diagnosis Date   • B12 deficiency 4/2/2021   • Costochondritis    • Depression    • Fibromyalgia    • IBS (irritable bowel syndrome)    • Muscle disorder    • Muscle pain    • Pelvic floor dysfunction 4/26/2021   • Persistent  "postural-perceptual dizziness    • Sacro-iliac pain     joint dysfunction   • Screening breast examination     Slef; Admits   • Sjogren's disease (CMS/HCC)    • Urethral syndrome    • Vitamin D deficiency 4/2/2021   • Vocal cord dysfunction       Past Surgical History:   Procedure Laterality Date   • BREAST BIOPSY Left    • COLONOSCOPY     • LAPAROSCOPIC CHOLECYSTECTOMY     • REDUCTION MAMMAPLASTY Bilateral 2014   • WISDOM TOOTH EXTRACTION        Family History   Problem Relation Age of Onset   • Breast cancer Maternal Grandmother 80   • No Known Problems Mother    • Myasthenia gravis Father    • Hypertension Father    • Arthritis Other    • Diabetes Other    • Melanoma Other    • Ovarian cancer Neg Hx         Review of Systems   Constitutional: Negative.    HENT: Negative.    Eyes: Negative.    Respiratory: Negative.    Cardiovascular: Negative.    Gastrointestinal: Negative.    Endocrine: Negative.    Genitourinary: Negative.    Musculoskeletal: Negative.    Skin: Negative.    Allergic/Immunologic: Negative.    Neurological: Negative.    Hematological: Negative.    Psychiatric/Behavioral: Negative.      Objective:    Pulse 78   Temp 96.8 °F (36 °C)   Ht 167.6 cm (66\")   Wt 99.8 kg (220 lb)   SpO2 98%   BMI 35.51 kg/m²     Neurology Exam:  General apperance: NAD.     Mental status: Alert, awake and oriented to time place and person.    Recent and Remote memory: Can recall 3/3 objects at 5 minutes. Can recall historical events.     Attention span and Concentration: Serial 7s: Normal.     Fund of knowledge:  Normal.     Language and Speech: No aphasia or dysarthria.    Naming , Repitition and Comprehension:  Can name objects, repeat a sentence and follow commands. Speech is clear and fluent with good repetition, comprehension, and naming.    CN II to XII: Intact.    Opthalmoscopic Exam: No papilledema.    Motor:  Right UE muscle strength 5/5. Normal tone.     Left UE muscle strength 5/5. Normal tone.      Right " LE muscle strength5/5. Normal tone.     Left LE muscle strength 5/5. Normal tone.      Sensory: Normal light touch, vibration and pinprick sensation bilaterally.    DTRs: 2+ bilaterally.    Babinski: Negative bilaterally.    Co-ordination: Normal finger-to-nose, heel to shin B/L.    Rhomberg: Negative.    Gait: Normal.    Cardiovascular: Regular rate and rhythm without murmur, gallop or rub.    Assessment and Plan:  1. Paresthesias  2. Bluish skin discoloration  3. Myalgia  She has had excellent response with vitamin D replacement and has had near complete resolution of all the symptoms that she was experiencing.  MRI brain was normal as well.  Continue with current medications and vitamin D replacement as per schedule and I will see her in clinic as needed.       I spent 30 minutes face to face with the patient and spent more than 50% of this time  in management, instructions and education regarding above mentioned diagnosis and also on counseling and discussing about taking medication regularly, possible side effects with medication use, importance of good sleep hygiene, good hydration and regular exercise.    Return if symptoms worsen or fail to improve.

## 2021-06-23 DIAGNOSIS — N30.90 CYSTITIS: Primary | ICD-10-CM

## 2021-08-09 ENCOUNTER — OFFICE VISIT (OUTPATIENT)
Dept: UROLOGY | Age: 51
End: 2021-08-09

## 2021-08-09 VITALS
RESPIRATION RATE: 16 BRPM | BODY MASS INDEX: 35.36 KG/M2 | HEART RATE: 73 BPM | HEIGHT: 66 IN | DIASTOLIC BLOOD PRESSURE: 78 MMHG | OXYGEN SATURATION: 98 % | WEIGHT: 220 LBS | SYSTOLIC BLOOD PRESSURE: 122 MMHG | TEMPERATURE: 98 F

## 2021-08-09 DIAGNOSIS — N30.90 CYSTITIS: ICD-10-CM

## 2021-08-09 DIAGNOSIS — M62.89 PELVIC FLOOR DYSFUNCTION: ICD-10-CM

## 2021-08-09 DIAGNOSIS — N30.90 CYSTITIS, UNSPECIFIED WITHOUT HEMATURIA: Primary | ICD-10-CM

## 2021-08-09 LAB
BILIRUB BLD-MCNC: NEGATIVE MG/DL
CLARITY, POC: CLEAR
COLOR UR: YELLOW
GLUCOSE UR STRIP-MCNC: NEGATIVE MG/DL
KETONES UR QL: NEGATIVE
LEUKOCYTE EST, POC: NEGATIVE
NITRITE UR-MCNC: NEGATIVE MG/ML
PH UR: 6 [PH] (ref 5–8)
PROT UR STRIP-MCNC: NEGATIVE MG/DL
RBC # UR STRIP: ABNORMAL /UL
SP GR UR: 1.01 (ref 1–1.03)
UROBILINOGEN UR QL: NORMAL

## 2021-08-09 PROCEDURE — 51798 US URINE CAPACITY MEASURE: CPT | Performed by: UROLOGY

## 2021-08-09 PROCEDURE — 99204 OFFICE O/P NEW MOD 45 MIN: CPT | Performed by: UROLOGY

## 2021-08-09 PROCEDURE — 81003 URINALYSIS AUTO W/O SCOPE: CPT | Performed by: UROLOGY

## 2021-08-09 RX ORDER — FLUTICASONE PROPIONATE 50 MCG
2 SPRAY, SUSPENSION (ML) NASAL DAILY
COMMUNITY

## 2021-08-09 NOTE — PROGRESS NOTES
"  Chief Complaint   Patient presents with   • CYSTITIS   • BURNING SENSATION IN URETHRA     Referring Provider:  Sebastian Green MD    HPI  Ms. Nichols is a 51 y.o. female w/ Hx of fibromyalgia and chronic pain who presents as a referral for pelvic floor dysfunction, pelvic pain.  This is a chronic condition that she has had for many years, and used to follow with a urologist, Dr. Maher, prior to her moving.    In the past she took vesicare to \"soothe her urethra\"   She c/o urethral heaviness and burning.   She has occasional CONNIE and wears no pads.   + urinary urgency and frequency, though not extremely bothered by this.  She is mostly bothered by the urethral heaviness and pain  Severe dry mouth. Has stopped vesicare.  She had urethral pain when drinking sodas.  She has tried Prelief OTC in the past to help with this.    She had hypokalemia from TCA in past  Prior to seeing Dr. Maher, she followed with Dr. Lloyd who reportedly performed urethral dilations that made her pain even worse.    Past Medical History  Past Medical History:   Diagnosis Date   • B12 deficiency 4/2/2021   • Costochondritis    • Depression    • Fibromyalgia    • IBS (irritable bowel syndrome)    • Muscle disorder    • Muscle pain    • Pelvic floor dysfunction 4/26/2021   • Persistent postural-perceptual dizziness    • Sacro-iliac pain     joint dysfunction   • Screening breast examination     Slef; Admits   • Sjogren's disease (CMS/HCC)    • Urethral syndrome    • Vitamin D deficiency 4/2/2021   • Vocal cord dysfunction        Past Surgical History  Past Surgical History:   Procedure Laterality Date   • BREAST BIOPSY Left    • COLONOSCOPY     • LAPAROSCOPIC CHOLECYSTECTOMY     • REDUCTION MAMMAPLASTY Bilateral 2014   • WISDOM TOOTH EXTRACTION       Medications    Current Outpatient Medications:   •  Calcium Glycerophosphate (PRELIEF PO), Take  by mouth., Disp: , Rfl:   •  Carboxymethylcellulose Sodium (EYE DROPS OP), Apply  to eye(s) as " directed by provider., Disp: , Rfl:   •  celecoxib (CeleBREX) 200 MG capsule, Take 200 mg by mouth Daily., Disp: , Rfl:   •  Cetirizine HCl (ZyrTEC Allergy) 10 MG capsule, Zyrtec, Disp: , Rfl:   •  fluticasone (FLONASE) 50 MCG/ACT nasal spray, 2 sprays into the nostril(s) as directed by provider Daily., Disp: , Rfl:   •  lansoprazole (PREVACID) 15 MG capsule, Take 15 mg by mouth Daily., Disp: , Rfl:   •  vitamin D (ERGOCALCIFEROL) 1.25 MG (25718 UT) capsule capsule, Take 50,000 Units by mouth 1 (One) Time Per Week., Disp: , Rfl:   •  solifenacin (VESIcare) 10 MG tablet, Take 1 tablet by mouth Daily., Disp: 30 tablet, Rfl: 2    Allergies  Allergies   Allergen Reactions   • Cetirizine Unknown - Low Severity   • Mometasone Unknown - Low Severity   • Tricyclic Antidepressants Unknown - Low Severity       Social History  Social History     Socioeconomic History   • Marital status:      Spouse name: Not on file   • Number of children: 1   • Years of education: Not on file   • Highest education level: Not on file   Tobacco Use   • Smoking status: Never Smoker   • Smokeless tobacco: Never Used   Vaping Use   • Vaping Use: Never used   Substance and Sexual Activity   • Alcohol use: Not Currently     Comment: Denies alcohol--per--[employer]   • Drug use: Never   • Sexual activity: Yes     Partners: Male     Birth control/protection: Condom       Family History  She has no family history of kidney stones  Family History   Problem Relation Age of Onset   • Breast cancer Maternal Grandmother 80   • No Known Problems Mother    • Myasthenia gravis Father    • Hypertension Father    • Arthritis Other    • Diabetes Other    • Melanoma Other    • Ovarian cancer Neg Hx        Review of Systems  Constitutional: No fevers or chills  Skin: Negative for rash  Endocrine: No heat/cold intolerance   Cardiovascular: Negative for chest pain or dyspnea on exertion  Respiratory: Negative for shortness of breath or  "wheezing  Gastrointestinal: No constipation, nausea or vomiting  Genitourinary: + urethral discomfort  Musculoskeletal:  No flank pain  Neurological:  Negative for frequent headaches or dizziness  Lymph/Heme: Negative for leg swelling or calf pain.    Physical Exam  Visit Vitals  /78   Pulse 73   Temp 98 °F (36.7 °C) (Temporal)   Resp 16   Ht 167.6 cm (66\")   Wt 99.8 kg (220 lb)   SpO2 98%   BMI 35.51 kg/m²     Constitutional: NAD, WDWN.   HEENT: NCAT. Conjunctivae normal.  MMM.    Cardiovascular:  She has regular rate.  Pulmonary/Chest: Respirations are even and non-labored bilaterally.  Abdominal: Soft. No distension, tenderness, masses or guarding. No CVA tenderness.  Neurological: A + O x 3.  Cranial Nerves II-XII grossly intact.  Extremities: JENARO x 4, Warm. No clubbing.  No cyanosis.    Skin: Pink, warm and dry.  No rashes noted.  Psychiatric:  Normal mood and affect    Genitourinary:   deferred    Labs  Urine cultures reveal mostly negative cultures in the past, with only one positive culture with Morganella.    Brief Urine Lab Results  (Last result in the past 365 days)      Color   Clarity   Blood   Leuk Est   Nitrite   Protein   CREAT   Urine HCG        08/09/21 1321 Yellow Clear Trace Negative Negative Negative             Post-Void Residual  A post-void residual was measured by ultrasonic bladder scanner by staff - 0 mL    Radiographic Studies         Assessment  Ms. Nichols is a 51 y.o. female who presents with chronic pelvic pain, reported pelvic floor dysfunction, dysuria. Many Sx are consistent with IC.  She has had microscopic hematuria, though just trace on dipstick today.    Plan  1.  Fu 4 weeks cystoscopy.  We will evaluate to make sure there are no bladder tumors or Cachorro's ulcers given her symptomatology.  I also think rescue cocktails with DMSO, heparin, lidocaine would be of great benefit to her.   2..  PFPT for the pelvic floor dysfunction.   3.  We did also discuss hydroxyzine is a " chronic medication.

## 2021-09-10 ENCOUNTER — OFFICE VISIT (OUTPATIENT)
Dept: UROLOGY | Facility: CLINIC | Age: 51
End: 2021-09-10

## 2021-09-10 DIAGNOSIS — N30.90 CYSTITIS: Primary | ICD-10-CM

## 2021-09-10 PROCEDURE — 52000 CYSTOURETHROSCOPY: CPT | Performed by: UROLOGY

## 2021-09-10 NOTE — PROGRESS NOTES
Preprocedure diagnosis  Chronic pelvic pain    Postprocedure diagnosis  Same, no ulcer seen    Procedure  Flexible Cystourethroscopy    Attending surgeon  Kapil Reaves MD    Anesthesia  2% lidocaine jelly intraurethrally    Complications  None    Indications  51 y.o. female undergoing a flexible cystoscopy for the above mentioned indications.    Informed consent was obtained.      Findings  Cystoscopy revealed one right and left ureteral orifice in the normal anatomic position, normal bladder mucosa and no tumors, masses or stones.  Normal cystoscopy    Procedure  The patient was placed in supine position and prepped and draped in sterile fashion with lidocaine jelly per urethra for anesthesia.  A timeout was performed.  The 14F flexible cystoscope was lubricated and gently placed through the urethra and into the bladder.  The bladder was completely visualized.  The cystoscope was retroflexed and the bladder neck visualized.  The scope was withdrawn and the procedure terminated.  The patient tolerated the procedure well.      Plan  The patient will try pelvic floor physical therapy  We will do bladder cocktail today  She will follow-up in 5 months

## 2021-11-01 ENCOUNTER — TREATMENT (OUTPATIENT)
Dept: PHYSICAL THERAPY | Facility: CLINIC | Age: 51
End: 2021-11-01

## 2021-11-01 DIAGNOSIS — R10.2 PELVIC PAIN: ICD-10-CM

## 2021-11-01 DIAGNOSIS — M62.89 PELVIC FLOOR DYSFUNCTION: Primary | ICD-10-CM

## 2021-11-01 DIAGNOSIS — N94.89 HIGH-TONE PELVIC FLOOR DYSFUNCTION: ICD-10-CM

## 2021-11-01 PROCEDURE — 97530 THERAPEUTIC ACTIVITIES: CPT | Performed by: PHYSICAL THERAPIST

## 2021-11-01 PROCEDURE — 97162 PT EVAL MOD COMPLEX 30 MIN: CPT | Performed by: PHYSICAL THERAPIST

## 2021-11-01 NOTE — PROGRESS NOTES
Physical Therapy Initial Evaluation and Plan of Care    Patient: Edwige Nichols   : 1970  Diagnosis/ICD-10 Code:  Pelvic floor dysfunction [M62.89]  Referring practitioner: Kapil Reaves MD  Date of Initial Visit: 2021  Today's Date: 2021  Patient seen for 1 sessions      Subjective      Diagnosed with pelvic floor dysfunction about 10 years ago and did pelvic PT at that time. Somewhat figured out how to live daily life with it. In April saw a new urologist due to hers moving away. She had been told previously that no one in this area really understood what was going on with pelvic floor issues. Had first UTI in April ever. Continued having urethral pain and frequency and urgency since then. Feeling miserable. Everything tested clear but still having issues. Saw Dr. Reaves who sent her for pelvic PT today. He feels like she has IC. Did a cocktail in bladder and cystoscopy. Feels she is better from what he did but symptoms are not totally gone. Sees him for follow up in February. Has also been diagnosed with Sjogren's in the last few years. Had previously tried Vesicare for bladder issues but not sure helpful and causes dry mouth which is already a problem. Previously it has numbed her urethra. History of hip and low back pain. History of plantar fascitis in L foot.       Chief Complaint:   Chief Complaint   Patient presents with   • Initial Evaluation      Functional Outcome Measure: Female NIH CPSI     Pain  Has on a daily basis and has to be cautious of what she does.  Can't stand for long periods of time. Feels like has a bowling ball in urethra/vagina area. Feels close to surface.   Urethra is what started it all and she had been treated with antibiotics without actual infection. Takes lots of baths and uses heating pad in that area.   Sugar really bothers her. Can flare if she eats sugar. Can't do sodas or citrus. Since had cocktail last few weeks better with pain now uncomfortable vs  unbearable    Average:Pelvic #: 6 Best: Pelvic #: 2 Worst: Pelvic #: 8    Bladder function:    Incontinence: no  Leak at night: no  Urination at night: 2x/night  Use bathroom “just in case”: no  Strong urinary urge: yes  Leaking with urge: no  Complete bladder voiding: did UDS and said emptying well  Frequency of urination: every 20-30 minutes before cocktail; now about 45-60 mins can hold longer without discomfort  Discomfort with urination: yes  Fluid consumed daily: 20 bottles of water day; mouth stays dry       Bowel function:  Hasn't had issues with bowels in a long time. Had some anal bleeding many years ago and colonoscopy was fine.   Otherwise having no issues.     Sexual activity  Sexually active: not really having pain but prior to seeing Dr. Reaves had pain when went to pee after sex. Only one time. Can feel irritated after sex. Toilet paper can irritate that skin when wiping at times; has had contact dermatitis in that area      Prior PT or other treatment: prior pelvic PT     Diagnostics:    PMH:   Past Medical History:   Diagnosis Date   • B12 deficiency 4/2/2021   • Costochondritis    • Depression    • Fibromyalgia    • IBS (irritable bowel syndrome)    • Muscle disorder    • Muscle pain    • Pelvic floor dysfunction 4/26/2021   • Persistent postural-perceptual dizziness    • Sacro-iliac pain     joint dysfunction   • Screening breast examination     Slef; Admits   • Sjogren's disease (CMS/HCC)    • Urethral syndrome    • Vitamin D deficiency 4/2/2021   • Vocal cord dysfunction         Surgical HX:   Past Surgical History:   Procedure Laterality Date   • BREAST BIOPSY Left    • COLONOSCOPY     • LAPAROSCOPIC CHOLECYSTECTOMY     • REDUCTION MAMMAPLASTY Bilateral 2014   • WISDOM TOOTH EXTRACTION          Meds:   Current Outpatient Medications:   •  Calcium Glycerophosphate (PRELIEF PO), Take  by mouth., Disp: , Rfl:   •  Carboxymethylcellulose Sodium (EYE DROPS OP), Apply  to eye(s) as directed by  provider., Disp: , Rfl:   •  celecoxib (CeleBREX) 200 MG capsule, Take 200 mg by mouth Daily., Disp: , Rfl:   •  Cetirizine HCl (ZyrTEC Allergy) 10 MG capsule, Zyrtec, Disp: , Rfl:   •  fluticasone (FLONASE) 50 MCG/ACT nasal spray, 2 sprays into the nostril(s) as directed by provider Daily., Disp: , Rfl:   •  lansoprazole (PREVACID) 15 MG capsule, Take 15 mg by mouth Daily., Disp: , Rfl:   •  solifenacin (VESIcare) 10 MG tablet, Take 1 tablet by mouth Daily., Disp: 30 tablet, Rfl: 2  •  vitamin D (ERGOCALCIFEROL) 1.25 MG (78572 UT) capsule capsule, Take 50,000 Units by mouth 1 (One) Time Per Week., Disp: , Rfl:          Activity level/exercise routine: feels limited due to her pain    Diet/Food intake: avoids spicy, acidic foods and sugar as able because these can increase symptoms.           Objective      verbal consent obtained for internal pelvic exam/treatment with declined need for second person in room    Palpation:   Supine:   Abdominals, Iliacus, psoas - moderate B  Adductors - mild B    External:    Ischiocavernosus - mild tension B   Supf and deep transverse perineal mm  - mild tension B   Skin intact - no gross abnormalities   Sensation intact to light touch B     Internal:   Sensation: decreased on L  Bulge: incomplete  Knack: weak  Wall Laxity: unremarkable     L/R supf mm: moderate tension B   Levator ani: moderate tension B   Obturator internus: mild tension B   Compressor urethra: mild-mod tension with urge mild - L>R    MMT: 2/5    See flowsheet for details of treatment following evaluation.       Assessment/Plan:     The patient is a 51 y.o. female who presents to physical therapy today for pelvic pain and urinary urgency and frequency. Upon initial evaluation, the patient demonstrates the following impairments: moderate tension of abdominal, hip and pelvic floor mm. Due to these impairments, the patient is unable to exercise, perform daily activities without pain. The patient would benefit from  skilled pelvic PT services to address functional limitations and impairments and to improve patient quality of life.      Goals:   STG's: 4 weeks  · Patient will report > 25% reduction in overall pain   · Patient will report > 25% improvement in urinary symptoms  · Patient will be able to perform HEP with minimal verbal cues    LTG's: By discharge  · Patient will report a decrease in pain to < 2/10  · Patient will report >75% improvement in urinary symptoms   · Patient will report an elimination of urinary urgency   · Patient will be independent with HEP      Plan  Therapy options: will be seen for skilled physical therapy services  Planned modality interventions: TENS, ultrasound, cryotherapy, thermotherapy (hydrocollator packs) and high voltage pulsed current (pain management)  Planned therapy interventions: abdominal trunk stabilization, manual therapy, neuromuscular re-education, body mechanics training, flexibility, functional ROM exercises, gait training, home exercise program, joint mobilization, therapeutic activities, stretching, strengthening, spinal/joint mobilization, soft tissue mobilization and postural training  Pt prognosis: fair  Frequency: weekly  Duration in visits: 12  Duration in weeks: 12  Treatment plan discussed with: patient    1107/1145  Timed:         Manual Therapy:    0     mins  93504;     Therapeutic Exercise:    0   mins  95520;     Neuromuscular Dejuan:    0    mins  16149;    Therapeutic Activity:     8     mins  58901;     Gait Trainin     mins  31870;     Ultrasound:     0     mins  50567;    Ionto                               0    mins   75455  Self Care                       0     mins   32505  Canalith Repos    0     mins 98025      Un-Timed:  Electrical Stimulation:    0     mins  95192 ( );  Dry Needling     0     mins self-pay  Traction     0     mins 64149  Low Eval     0     Mins  63006  Mod Eval     0     Mins  97225  High Eval                       30    Mins  50754  Re-Eval                           0    mins  88732        Timed Treatment:   8   mins   Total Treatment:     40   mins    PT SIGNATURE:Daniel Fernandez PT, DPT  DATE TREATMENT INITIATED: 11/01/2021    Initial Certification  Certification Period: 1/31/2022  I certify that the therapy services are furnished while this patient is under my care.  The services outlined above are required by this patient, and will be reviewed every 90 days.     PHYSICIAN: Kapil Reaves MD      DATE: 11/01/2021     Please sign and return via fax to [phone]. Thank you, [facility].

## 2021-11-16 ENCOUNTER — OFFICE VISIT (OUTPATIENT)
Dept: FAMILY MEDICINE CLINIC | Facility: CLINIC | Age: 51
End: 2021-11-16

## 2021-11-16 VITALS
SYSTOLIC BLOOD PRESSURE: 148 MMHG | WEIGHT: 211 LBS | HEART RATE: 84 BPM | DIASTOLIC BLOOD PRESSURE: 82 MMHG | TEMPERATURE: 97.8 F | BODY MASS INDEX: 33.91 KG/M2 | HEIGHT: 66 IN | RESPIRATION RATE: 18 BRPM

## 2021-11-16 DIAGNOSIS — R10.33 UMBILICAL PAIN: ICD-10-CM

## 2021-11-16 DIAGNOSIS — R10.9 LEFT FLANK PAIN: Primary | ICD-10-CM

## 2021-11-16 DIAGNOSIS — M62.89 PELVIC FLOOR DYSFUNCTION: ICD-10-CM

## 2021-11-16 DIAGNOSIS — R11.0 NAUSEA: ICD-10-CM

## 2021-11-16 LAB
BILIRUB BLD-MCNC: NEGATIVE MG/DL
CLARITY, POC: CLEAR
COLOR UR: YELLOW
EXPIRATION DATE: ABNORMAL
GLUCOSE UR STRIP-MCNC: NEGATIVE MG/DL
KETONES UR QL: NEGATIVE
LEUKOCYTE EST, POC: NEGATIVE
Lab: ABNORMAL
NITRITE UR-MCNC: NEGATIVE MG/ML
PH UR: 5.5 [PH] (ref 5–8)
PROT UR STRIP-MCNC: NEGATIVE MG/DL
RBC # UR STRIP: ABNORMAL /UL
SP GR UR: 1.03 (ref 1–1.03)
UROBILINOGEN UR QL: NORMAL

## 2021-11-16 PROCEDURE — 99214 OFFICE O/P EST MOD 30 MIN: CPT | Performed by: FAMILY MEDICINE

## 2021-11-16 PROCEDURE — 81003 URINALYSIS AUTO W/O SCOPE: CPT | Performed by: FAMILY MEDICINE

## 2021-11-16 RX ORDER — TRAMADOL HYDROCHLORIDE 50 MG/1
50 TABLET ORAL EVERY 6 HOURS PRN
Qty: 15 TABLET | Refills: 0 | Status: SHIPPED | OUTPATIENT
Start: 2021-11-16 | End: 2022-10-19

## 2021-11-16 RX ORDER — SULFAMETHOXAZOLE AND TRIMETHOPRIM 800; 160 MG/1; MG/1
1 TABLET ORAL 2 TIMES DAILY
Qty: 20 TABLET | Refills: 0 | Status: SHIPPED | OUTPATIENT
Start: 2021-11-16 | End: 2021-12-13

## 2021-11-16 RX ORDER — ONDANSETRON 8 MG/1
8 TABLET, ORALLY DISINTEGRATING ORAL EVERY 8 HOURS PRN
Qty: 20 TABLET | Refills: 1 | Status: SHIPPED | OUTPATIENT
Start: 2021-11-16

## 2021-11-16 NOTE — PROGRESS NOTES
Subjective   Edwige Nichols is a 51 y.o. female.     History of Present Illness     For the past week she has had abdominal pain around her umbilicus  Has had lower bladder and urethral pain  Has had some nausea  General discomfort and pain in her left flank  No fever      last week she had some red streaks emanating out from her umbilicus but this did get better    Saw urology for IC and pelvic floor dysfunction  She had bad pain after her first pelvic floor PT  The pain was miserable after she had PT  I offered her PRN medicine to take for this as needed    Review of Systems   Constitutional: Negative for fever.   Gastrointestinal: Positive for abdominal pain and nausea.       Objective   Physical Exam  Vitals and nursing note reviewed.   Constitutional:       General: She is not in acute distress.     Appearance: Normal appearance. She is well-developed.   Cardiovascular:      Rate and Rhythm: Normal rate and regular rhythm.      Heart sounds: Normal heart sounds.   Pulmonary:      Effort: Pulmonary effort is normal.      Breath sounds: Normal breath sounds.   Abdominal:      General: Abdomen is flat. Bowel sounds are normal. There is no distension.      Tenderness: There is no abdominal tenderness. There is left CVA tenderness (very mild). There is no right CVA tenderness.      Comments: Normal inspection and palpation of umbilicus   Neurological:      Mental Status: She is alert and oriented to person, place, and time.   Psychiatric:         Mood and Affect: Mood normal.         Behavior: Behavior normal.         Thought Content: Thought content normal.         Judgment: Judgment normal.         Assessment/Plan   Diagnoses and all orders for this visit:    1. Left flank pain (Primary)  -     sulfamethoxazole-trimethoprim (Bactrim DS) 800-160 MG per tablet; Take 1 tablet by mouth 2 (Two) Times a Day.  Dispense: 20 tablet; Refill: 0  -     Urine Culture - , Urine, Clean Catch  -     POCT urinalysis dipstick,  automated    2. Umbilical pain    3. Nausea  -     ondansetron ODT (Zofran ODT) 8 MG disintegrating tablet; Place 1 tablet on the tongue Every 8 (Eight) Hours As Needed for Nausea.  Dispense: 20 tablet; Refill: 1    4. Pelvic floor dysfunction  -     traMADol (ULTRAM) 50 MG tablet; Take 1 tablet by mouth Every 6 (Six) Hours As Needed for Moderate Pain  or Severe Pain .  Dispense: 15 tablet; Refill: 0    will treat with bactrim and check urine culture.  Pt to call back INB  Ok PRN zofran for nausea  She had a lot of pain after pelvic floor PT, will give her tramadol to use PRN for this pain

## 2021-11-18 LAB
BACTERIA UR CULT: NORMAL
BACTERIA UR CULT: NORMAL

## 2021-11-19 ENCOUNTER — TELEPHONE (OUTPATIENT)
Dept: PHYSICAL THERAPY | Facility: CLINIC | Age: 51
End: 2021-11-19

## 2021-11-19 NOTE — TELEPHONE ENCOUNTER
SHE WANTS TO TALK SHE HAS BEEN SICK AND NOT FEELING WELL SINCE HER FIRST VISIT. SHE WAS WANTING TO ASK A COUPLE QUESTIONS.

## 2021-11-23 ENCOUNTER — HOSPITAL ENCOUNTER (EMERGENCY)
Facility: HOSPITAL | Age: 51
Discharge: HOME | End: 2021-11-24
Attending: STUDENT IN AN ORGANIZED HEALTH CARE EDUCATION/TRAINING PROGRAM | Admitting: STUDENT IN AN ORGANIZED HEALTH CARE EDUCATION/TRAINING PROGRAM

## 2021-11-23 DIAGNOSIS — R10.9 LEFT FLANK PAIN: Primary | ICD-10-CM

## 2021-11-23 DIAGNOSIS — R10.32 LEFT LOWER QUADRANT ABDOMINAL PAIN: ICD-10-CM

## 2021-11-23 PROCEDURE — 99283 EMERGENCY DEPT VISIT LOW MDM: CPT

## 2021-11-23 PROCEDURE — 81025 URINE PREGNANCY TEST: CPT | Performed by: STUDENT IN AN ORGANIZED HEALTH CARE EDUCATION/TRAINING PROGRAM

## 2021-11-23 PROCEDURE — 85025 COMPLETE CBC W/AUTO DIFF WBC: CPT | Performed by: STUDENT IN AN ORGANIZED HEALTH CARE EDUCATION/TRAINING PROGRAM

## 2021-11-23 PROCEDURE — 83605 ASSAY OF LACTIC ACID: CPT | Performed by: STUDENT IN AN ORGANIZED HEALTH CARE EDUCATION/TRAINING PROGRAM

## 2021-11-23 PROCEDURE — 83690 ASSAY OF LIPASE: CPT | Performed by: STUDENT IN AN ORGANIZED HEALTH CARE EDUCATION/TRAINING PROGRAM

## 2021-11-23 RX ORDER — SODIUM CHLORIDE 9 MG/ML
10 INJECTION INTRAVENOUS AS NEEDED
Status: DISCONTINUED | OUTPATIENT
Start: 2021-11-23 | End: 2021-11-24 | Stop reason: HOSPADM

## 2021-11-24 ENCOUNTER — APPOINTMENT (OUTPATIENT)
Dept: CT IMAGING | Facility: HOSPITAL | Age: 51
End: 2021-11-24

## 2021-11-24 VITALS
DIASTOLIC BLOOD PRESSURE: 73 MMHG | TEMPERATURE: 98 F | SYSTOLIC BLOOD PRESSURE: 145 MMHG | RESPIRATION RATE: 18 BRPM | BODY MASS INDEX: 33.91 KG/M2 | HEART RATE: 88 BPM | HEIGHT: 66 IN | WEIGHT: 211 LBS | OXYGEN SATURATION: 98 %

## 2021-11-24 LAB
ALBUMIN SERPL-MCNC: 5 G/DL (ref 3.5–5.2)
ALBUMIN/GLOB SERPL: 1.8 G/DL
ALP SERPL-CCNC: 78 U/L (ref 39–117)
ALT SERPL W P-5'-P-CCNC: 15 U/L (ref 1–33)
ANION GAP SERPL CALCULATED.3IONS-SCNC: 14 MMOL/L (ref 5–15)
AST SERPL-CCNC: 25 U/L (ref 1–32)
B-HCG UR QL: NEGATIVE
BACTERIA UR QL AUTO: NORMAL /HPF
BASOPHILS # BLD AUTO: 0.04 10*3/MM3 (ref 0–0.2)
BASOPHILS NFR BLD AUTO: 0.4 % (ref 0–1.5)
BILIRUB SERPL-MCNC: <0.2 MG/DL (ref 0–1.2)
BILIRUB UR QL STRIP: NEGATIVE
BUN SERPL-MCNC: 13 MG/DL (ref 6–20)
BUN/CREAT SERPL: 13.3 (ref 7–25)
CALCIUM SPEC-SCNC: 9.2 MG/DL (ref 8.6–10.5)
CHLORIDE SERPL-SCNC: 100 MMOL/L (ref 98–107)
CLARITY UR: CLEAR
CO2 SERPL-SCNC: 21 MMOL/L (ref 22–29)
COLOR UR: YELLOW
CREAT SERPL-MCNC: 0.98 MG/DL (ref 0.57–1)
D-LACTATE SERPL-SCNC: 2.1 MMOL/L (ref 0.5–2)
DEPRECATED RDW RBC AUTO: 41.7 FL (ref 37–54)
EOSINOPHIL # BLD AUTO: 0.07 10*3/MM3 (ref 0–0.4)
EOSINOPHIL NFR BLD AUTO: 0.7 % (ref 0.3–6.2)
ERYTHROCYTE [DISTWIDTH] IN BLOOD BY AUTOMATED COUNT: 13.7 % (ref 12.3–15.4)
EXPIRATION DATE: NORMAL
GFR SERPL CREATININE-BSD FRML MDRD: 60 ML/MIN/1.73
GLOBULIN UR ELPH-MCNC: 2.8 GM/DL
GLUCOSE SERPL-MCNC: 117 MG/DL (ref 65–99)
GLUCOSE UR STRIP-MCNC: NEGATIVE MG/DL
HCT VFR BLD AUTO: 39.5 % (ref 34–46.6)
HGB BLD-MCNC: 12.7 G/DL (ref 12–15.9)
HGB UR QL STRIP.AUTO: ABNORMAL
HOLD SPECIMEN: NORMAL
HYALINE CASTS UR QL AUTO: NORMAL /LPF
IMM GRANULOCYTES # BLD AUTO: 0.03 10*3/MM3 (ref 0–0.05)
IMM GRANULOCYTES NFR BLD AUTO: 0.3 % (ref 0–0.5)
INTERNAL NEGATIVE CONTROL: NEGATIVE
INTERNAL POSITIVE CONTROL: POSITIVE
KETONES UR QL STRIP: NEGATIVE
LEUKOCYTE ESTERASE UR QL STRIP.AUTO: NEGATIVE
LIPASE SERPL-CCNC: 33 U/L (ref 13–60)
LYMPHOCYTES # BLD AUTO: 3.36 10*3/MM3 (ref 0.7–3.1)
LYMPHOCYTES NFR BLD AUTO: 33.4 % (ref 19.6–45.3)
Lab: NORMAL
MCH RBC QN AUTO: 26.7 PG (ref 26.6–33)
MCHC RBC AUTO-ENTMCNC: 32.2 G/DL (ref 31.5–35.7)
MCV RBC AUTO: 83 FL (ref 79–97)
MONOCYTES # BLD AUTO: 0.63 10*3/MM3 (ref 0.1–0.9)
MONOCYTES NFR BLD AUTO: 6.3 % (ref 5–12)
NEUTROPHILS NFR BLD AUTO: 5.94 10*3/MM3 (ref 1.7–7)
NEUTROPHILS NFR BLD AUTO: 58.9 % (ref 42.7–76)
NITRITE UR QL STRIP: NEGATIVE
NRBC BLD AUTO-RTO: 0 /100 WBC (ref 0–0.2)
PH UR STRIP.AUTO: 5.5 [PH] (ref 5–8)
PLATELET # BLD AUTO: 272 10*3/MM3 (ref 140–450)
PMV BLD AUTO: 10.3 FL (ref 6–12)
POTASSIUM SERPL-SCNC: 3.7 MMOL/L (ref 3.5–5.2)
PROT SERPL-MCNC: 7.8 G/DL (ref 6–8.5)
PROT UR QL STRIP: NEGATIVE
RBC # BLD AUTO: 4.76 10*6/MM3 (ref 3.77–5.28)
RBC # UR STRIP: NORMAL /HPF
REF LAB TEST METHOD: NORMAL
SODIUM SERPL-SCNC: 135 MMOL/L (ref 136–145)
SP GR UR STRIP: <=1.005 (ref 1–1.03)
SQUAMOUS #/AREA URNS HPF: NORMAL /HPF
UROBILINOGEN UR QL STRIP: ABNORMAL
WBC # UR STRIP: NORMAL /HPF
WBC NRBC COR # BLD: 10.07 10*3/MM3 (ref 3.4–10.8)
WHOLE BLOOD HOLD SPECIMEN: NORMAL
WHOLE BLOOD HOLD SPECIMEN: NORMAL

## 2021-11-24 PROCEDURE — 80053 COMPREHEN METABOLIC PANEL: CPT | Performed by: STUDENT IN AN ORGANIZED HEALTH CARE EDUCATION/TRAINING PROGRAM

## 2021-11-24 PROCEDURE — 81001 URINALYSIS AUTO W/SCOPE: CPT | Performed by: STUDENT IN AN ORGANIZED HEALTH CARE EDUCATION/TRAINING PROGRAM

## 2021-11-24 PROCEDURE — 25010000002 IOPAMIDOL 61 % SOLUTION: Performed by: STUDENT IN AN ORGANIZED HEALTH CARE EDUCATION/TRAINING PROGRAM

## 2021-11-24 PROCEDURE — 74177 CT ABD & PELVIS W/CONTRAST: CPT

## 2021-11-24 RX ADMIN — IOPAMIDOL 100 ML: 612 INJECTION, SOLUTION INTRAVENOUS at 00:50

## 2021-11-24 RX ADMIN — SODIUM CHLORIDE, POTASSIUM CHLORIDE, SODIUM LACTATE AND CALCIUM CHLORIDE 1000 ML: 600; 310; 30; 20 INJECTION, SOLUTION INTRAVENOUS at 01:12

## 2021-11-29 ENCOUNTER — OFFICE VISIT (OUTPATIENT)
Dept: UROLOGY | Facility: CLINIC | Age: 51
End: 2021-11-29

## 2021-11-29 VITALS
DIASTOLIC BLOOD PRESSURE: 102 MMHG | HEIGHT: 66 IN | SYSTOLIC BLOOD PRESSURE: 200 MMHG | OXYGEN SATURATION: 99 % | TEMPERATURE: 98 F | BODY MASS INDEX: 33.91 KG/M2 | WEIGHT: 211 LBS | HEART RATE: 94 BPM

## 2021-11-29 DIAGNOSIS — R10.2 PELVIC PAIN: ICD-10-CM

## 2021-11-29 DIAGNOSIS — R31.9 HEMATURIA, UNSPECIFIED TYPE: Primary | ICD-10-CM

## 2021-11-29 LAB
BILIRUB BLD-MCNC: NEGATIVE MG/DL
CLARITY, POC: CLEAR
COLOR UR: YELLOW
EXPIRATION DATE: ABNORMAL
GLUCOSE UR STRIP-MCNC: NEGATIVE MG/DL
KETONES UR QL: NEGATIVE
LEUKOCYTE EST, POC: NEGATIVE
Lab: ABNORMAL
NITRITE UR-MCNC: NEGATIVE MG/ML
PH UR: 6 [PH] (ref 5–8)
PROT UR STRIP-MCNC: NEGATIVE MG/DL
RBC # UR STRIP: ABNORMAL /UL
SP GR UR: 1.01 (ref 1–1.03)
UROBILINOGEN UR QL: NORMAL

## 2021-11-29 PROCEDURE — 99214 OFFICE O/P EST MOD 30 MIN: CPT | Performed by: UROLOGY

## 2021-11-29 PROCEDURE — 81003 URINALYSIS AUTO W/O SCOPE: CPT | Performed by: UROLOGY

## 2021-11-29 RX ORDER — CELECOXIB 200 MG/1
1 CAPSULE ORAL DAILY
COMMUNITY
Start: 2021-11-19 | End: 2022-10-19

## 2021-11-29 RX ORDER — TRIAMCINOLONE ACETONIDE 55 UG/1
SPRAY, METERED NASAL EVERY 24 HOURS
COMMUNITY

## 2021-11-29 RX ORDER — HYDROXYZINE HYDROCHLORIDE 25 MG/1
25 TABLET, FILM COATED ORAL
Qty: 30 TABLET | Refills: 11 | Status: SHIPPED | OUTPATIENT
Start: 2021-11-29 | End: 2022-10-19

## 2021-11-29 RX ORDER — CETIRIZINE HYDROCHLORIDE 10 MG/1
CAPSULE, LIQUID FILLED ORAL EVERY 24 HOURS
COMMUNITY
End: 2021-11-29

## 2021-11-29 NOTE — PROGRESS NOTES
Chief Complaint   Patient presents with   • Blood in Urine     New Symptoms of Hematuria. Discharged from  ER 11/24 w/ CT      HPI  Ms. Nichols is a 51 y.o. female with history below in assessment, who presents for follow up.     At this visit pt complains of back and abdominal pain, urethral pain and fullness.  These have been ongoing for the better part of the month.  She states that she has had increased pain ever since going to physical therapy appointment.  She also describes increased pain flares after any sort of procedure, including cystoscopy, colonoscopy.  She states that she has had multiple adverse reactions to antidepressants, tricyclics, and other medications for anxiety.  She was recently in the ER on the 24th and had CT below.  She had her urine tested with Dr. Adames for urinary tract infection, which was negative.  She says she saw her rheumatologist.    Past Medical History:   Diagnosis Date   • B12 deficiency 4/2/2021   • Costochondritis    • Depression    • Fibromyalgia    • IBS (irritable bowel syndrome)    • Muscle disorder    • Muscle pain    • Pelvic floor dysfunction 4/26/2021   • Persistent postural-perceptual dizziness    • Sacro-iliac pain     joint dysfunction   • Screening breast examination     Slef; Admits   • Sjogren's disease (HCC)    • Urethral syndrome    • Vitamin D deficiency 4/2/2021   • Vocal cord dysfunction        Past Surgical History:   Procedure Laterality Date   • BREAST BIOPSY Left    • COLONOSCOPY     • LAPAROSCOPIC CHOLECYSTECTOMY     • REDUCTION MAMMAPLASTY Bilateral 2014   • WISDOM TOOTH EXTRACTION           Current Outpatient Medications:   •  Calcium Glycerophosphate (PRELIEF PO), Take  by mouth., Disp: , Rfl:   •  Carboxymethylcellulose Sodium (EYE DROPS OP), Apply  to eye(s) as directed by provider., Disp: , Rfl:   •  celecoxib (CeleBREX) 200 MG capsule, Take 200 mg by mouth Daily., Disp: , Rfl:   •  Cetirizine HCl (ZyrTEC Allergy) 10 MG capsule, Zyrtec, Disp: ,  "Rfl:   •  Cholecalciferol 125 MCG (5000 UT) tablet, Daily., Disp: , Rfl:   •  lansoprazole (PREVACID) 15 MG capsule, Take 15 mg by mouth Daily., Disp: , Rfl:   •  ondansetron ODT (Zofran ODT) 8 MG disintegrating tablet, Place 1 tablet on the tongue Every 8 (Eight) Hours As Needed for Nausea., Disp: 20 tablet, Rfl: 1  •  solifenacin (VESIcare) 10 MG tablet, Take 1 tablet by mouth Daily., Disp: 30 tablet, Rfl: 2  •  Triamcinolone Acetonide (Nasacort Allergy 24HR) 55 MCG/ACT nasal inhaler, Daily., Disp: , Rfl:   •  vitamin D (ERGOCALCIFEROL) 1.25 MG (32660 UT) capsule capsule, Take 50,000 Units by mouth 1 (One) Time Per Week., Disp: , Rfl:   •  celecoxib (CeleBREX) 200 MG capsule, Take 1 capsule by mouth Daily., Disp: , Rfl:   •  Cetirizine HCl (ZyrTEC Allergy) 10 MG capsule, Daily., Disp: , Rfl:   •  fluticasone (FLONASE) 50 MCG/ACT nasal spray, 2 sprays into the nostril(s) as directed by provider Daily., Disp: , Rfl:   •  sulfamethoxazole-trimethoprim (Bactrim DS) 800-160 MG per tablet, Take 1 tablet by mouth 2 (Two) Times a Day., Disp: 20 tablet, Rfl: 0  •  traMADol (ULTRAM) 50 MG tablet, Take 1 tablet by mouth Every 6 (Six) Hours As Needed for Moderate Pain  or Severe Pain ., Disp: 15 tablet, Rfl: 0     Physical Exam  Visit Vitals  BP (!) 200/102   Pulse 94   Temp 98 °F (36.7 °C)   Ht 167.6 cm (65.98\")   Wt 95.7 kg (211 lb)   SpO2 99%   Breastfeeding No   BMI 34.07 kg/m²       Labs  Brief Urine Lab Results  (Last result in the past 365 days)      Color   Clarity   Blood   Leuk Est   Nitrite   Protein   CREAT   Urine HCG        11/29/21 0904 Yellow   Clear   1+   Negative   Negative   Negative                 Lab Results   Component Value Date    GLUCOSE 117 (H) 11/24/2021    CALCIUM 9.2 11/24/2021     (L) 11/24/2021    K 3.7 11/24/2021    CO2 21.0 (L) 11/24/2021     11/24/2021    BUN 13 11/24/2021    CREATININE 0.98 11/24/2021    EGFRIFAFRI 79 01/03/2020    EGFRIFNONA 60 (L) 11/24/2021    BCR 13.3 " 11/24/2021    ANIONGAP 14.0 11/24/2021       Lab Results   Component Value Date    WBC 10.07 11/23/2021    HGB 12.7 11/23/2021    HCT 39.5 11/23/2021    MCV 83.0 11/23/2021     11/23/2021       Urine Culture    Urine Culture 4/26/21 5/25/21 11/16/21   Urine Culture Final report Final report Final report            Radiographic Studies  CT Abdomen Pelvis With Contrast  Result Date: 11/24/2021  1. No acute abdominal or pelvic findings. 2. Normal appendix. 3. No definite obstructing urinary tract stones or hydronephrosis. Suspected small lower pole left parapelvic renal cysts. 4. Cholecystectomy. Signer Name: Ceferino Winn MD  Signed: 11/24/2021 1:10 AM  Workstation Name: [email or web address]  [facility]      I have reviewed the above labs and imaging.     Assessment  51 y.o. female with Sjogrens, chronic pelvic pain, reported pelvic floor dysfunction, dysuria. Many Sx are consistent with IC.  She has had microscopic hematuria, with normal cystoscopic work-up at last visit.  She is likely having a flare of her chronic disease, which is likely related to her multiple chronic pain syndromes.    We reviewed different treatment options and the treatment algorithm for interstitial cystitis/bladder pain syndrome, ranging from chronic oral medications, bladder installations, hydrodistention, as well as Botox and InterStim.  In general she wants to avoid any sort of procedures, as she states that her body has adverse reactions to any sort of procedure.  She also has a limited number of medications at this point that we can try.    Plan  1. Start hydroxyzine 25 mg p.o. nightly.  I stopped the cetirizine to the potential interaction.  2.  I offered her a bladder cocktail today for her likely IC flare, however she wants to hold off at this point.

## 2021-12-01 ENCOUNTER — TREATMENT (OUTPATIENT)
Dept: PHYSICAL THERAPY | Facility: CLINIC | Age: 51
End: 2021-12-01

## 2021-12-01 DIAGNOSIS — R10.2 PELVIC PAIN: ICD-10-CM

## 2021-12-01 DIAGNOSIS — M62.89 PELVIC FLOOR DYSFUNCTION: Primary | ICD-10-CM

## 2021-12-01 DIAGNOSIS — N94.89 HIGH-TONE PELVIC FLOOR DYSFUNCTION: ICD-10-CM

## 2021-12-01 PROCEDURE — 97140 MANUAL THERAPY 1/> REGIONS: CPT | Performed by: PHYSICAL THERAPIST

## 2021-12-01 PROCEDURE — 97110 THERAPEUTIC EXERCISES: CPT | Performed by: PHYSICAL THERAPIST

## 2021-12-01 NOTE — PROGRESS NOTES
Reassessment/Recertification      Patient: Edwige Nichols   : 1970  Diagnosis/ICD-10 Code:  Pelvic floor dysfunction [M62.89]  Referring practitioner: Kapil Reaves MD  Date of Initial Visit: 2021  Today's Date: 12/3/2021  Patient seen for 2 sessions      Subjective    Afternoon after she was seen last visit started feeling bad and escalated. Nauseous and feeling really bad. Then had a period which she hadn't had since August. Got so bad she went to the ER. Did CT scan. Saw Dr. Reaves this week and he thinks things just got angry with internal. PCP thought had UTI but didn't. More blood in urine than usual. Pretty much laying down the whole month of November. Urethra feels like going to explode and pelvic floor feels like bowling ball laying in it. Deep back pain that feels like insides, not like a pulled mm. Soupy and sick feeling in intestines and achey. Felt almost like the flu. Feels like body feels swollen even when doesn't look that way. History of strong negative response to colonoscopy and bladder instillation.       Chief Complaint:   Chief Complaint   Patient presents with   • Progress Note      Functional Outcome Measure: Female NIH-CPSI    Pain  Average:Pelvic #: 7 Best: Pelvic #: 4 Worst: Pelvic #: 10  Location: ***  Aggs: {AG:::1}    Bladder function:    Incontinence: ***  # of pads in 24 hours: ***   How soaked is pad when changed: ***  What specifically makes you leak: ***  Leak at night: ***  Urination at night: ***  Use bathroom “just in case”: ***  Strong urinary urge: ***  Leaking with urge: ***  Urge triggers: ***  Complete bladder voiding %: ***  Urinary splaying: ***  Post-micturition dribble: ***  Frequency of urination: ***  Discomfort with urination: ***  Vaginal or anal itching: ***  Fluid irritants consumed daily: ***  Food irritants consumed daily: ***    Bowel function:    How many episodes of leakage/month: ***  How many BM's/day: ***  [place] Stool Scale Type:  ***  Discomfort with BM: ***  Complete bowel voiding %: ***  Assistance to pass stool: ***  Diet: ***  Incontinence at night: ***  Incontinence with gas: ***  Ability to pass gas: ***    Sexual activity  Sexually active: ***  Pain with penetration: {Pain:53064:::1} Type: *** Location: ***  Pain after sex: {Pain:91184:::1} Type: *** Location: ***  Lubrication: ***  Positions of comfort & discomfort: ***  ED: ***    Prior PT or other treatment: ***    Diagnostics:    PMH:   Past Medical History:   Diagnosis Date   • B12 deficiency 4/2/2021   • Costochondritis    • Depression    • Fibromyalgia    • IBS (irritable bowel syndrome)    • Muscle disorder    • Muscle pain    • Pelvic floor dysfunction 4/26/2021   • Persistent postural-perceptual dizziness    • Sacro-iliac pain     joint dysfunction   • Screening breast examination     Slef; Admits   • Sjogren's disease (HCC)    • Urethral syndrome    • Vitamin D deficiency 4/2/2021   • Vocal cord dysfunction         Surgical HX:   Past Surgical History:   Procedure Laterality Date   • BREAST BIOPSY Left    • COLONOSCOPY     • LAPAROSCOPIC CHOLECYSTECTOMY     • REDUCTION MAMMAPLASTY Bilateral 2014   • WISDOM TOOTH EXTRACTION          Menstrual cycle: ***    Birth HX (#, when, type of delivery, complications): ***    Meds:   Current Outpatient Medications:   •  Calcium Glycerophosphate (PRELIEF PO), Take  by mouth., Disp: , Rfl:   •  Carboxymethylcellulose Sodium (EYE DROPS OP), Apply  to eye(s) as directed by provider., Disp: , Rfl:   •  celecoxib (CeleBREX) 200 MG capsule, Take 200 mg by mouth Daily., Disp: , Rfl:   •  celecoxib (CeleBREX) 200 MG capsule, Take 1 capsule by mouth Daily., Disp: , Rfl:   •  Cholecalciferol 125 MCG (5000 UT) tablet, Daily., Disp: , Rfl:   •  fluticasone (FLONASE) 50 MCG/ACT nasal spray, 2 sprays into the nostril(s) as directed by provider Daily., Disp: , Rfl:   •  hydrOXYzine (ATARAX) 25 MG tablet, Take 1 tablet by mouth every night at bedtime.,  Disp: 30 tablet, Rfl: 11  •  lansoprazole (PREVACID) 15 MG capsule, Take 15 mg by mouth Daily., Disp: , Rfl:   •  ondansetron ODT (Zofran ODT) 8 MG disintegrating tablet, Place 1 tablet on the tongue Every 8 (Eight) Hours As Needed for Nausea., Disp: 20 tablet, Rfl: 1  •  solifenacin (VESIcare) 10 MG tablet, Take 1 tablet by mouth Daily., Disp: 30 tablet, Rfl: 2  •  sulfamethoxazole-trimethoprim (Bactrim DS) 800-160 MG per tablet, Take 1 tablet by mouth 2 (Two) Times a Day., Disp: 20 tablet, Rfl: 0  •  traMADol (ULTRAM) 50 MG tablet, Take 1 tablet by mouth Every 6 (Six) Hours As Needed for Moderate Pain  or Severe Pain ., Disp: 15 tablet, Rfl: 0  •  Triamcinolone Acetonide (Nasacort Allergy 24HR) 55 MCG/ACT nasal inhaler, Daily., Disp: , Rfl:   •  vitamin D (ERGOCALCIFEROL) 1.25 MG (92430 UT) capsule capsule, Take 50,000 Units by mouth 1 (One) Time Per Week., Disp: , Rfl:      Occupation: ***    Activity level/exercise routine: ***    Diet/Food intake: ***          Objective      verbal consent obtained for internal pelvic exam/treatment with declined need for second person in room    Palpation:   Supine:   Abdominals, Iliacus - ***  Adductors ***    External:    Ischiocavernosus   Supf and deep transverse perineal mm   Perineal body   Levator ani    Internal:   Sensation: ***  Bulge: ***  Knack: ***  Wall Laxity: ***     L/R supf mm: ***   Internal supf perineal body:***   Levator ani: ***   Obturator internus: ***   Compressor urethra: ***    PERF SCALE:  Power: ***    Endurance: ***    Repetitions: ***    Fast twitch: ***        Assessment/Plan:     The patient is a 51 y.o. female who presents to physical therapy today for ***. Upon initial evaluation, the patient demonstrates the following impairments: ***. Due to these impairments, the patient is unable to ***. The patient would benefit from skilled pelvic PT services to address functional limitations and impairments and to improve patient quality of life.       Goals:   STG's: *** weeks  · Patient will report > ***% reduction in overall pain   · Patient will report > ***% improvement in urinary symptoms  · Patient will be able to perform HEP with minimal verbal cues    LTG's: By discharge  · Patient will report a decrease in pain to < ***/10  · Patient will report >***% improvement in urinary symptoms   · Patient will report an elimination of urinary urgency   · Patient will report an elimination of nocturia  · Patient will be able to tolerate an internal pelvic exam/vaginal penetration with pain <***/10   · Patient will be able to tolerate sitting >*** minutes without increased pain   · Patient will be able to tolerate standing >*** minutes to increase tolerance to work activities with decreased pain   · Patient will decrease voiding frequency to once every 3-4 hours  · Patient will be independent with HEP      Plan  Therapy options: will be seen for skilled physical therapy services  Planned modality interventions: TENS, ultrasound, cryotherapy, thermotherapy (hydrocollator packs) and high voltage pulsed current (pain management)  Planned therapy interventions: abdominal trunk stabilization, manual therapy, neuromuscular re-education, body mechanics training, flexibility, functional ROM exercises, gait training, home exercise program, joint mobilization, therapeutic activities, stretching, strengthening, spinal/joint mobilization, soft tissue mobilization and postural training  Pt prognosis: ***  Frequency: ***  Duration in visits: ***  Duration in weeks: ***  Treatment plan discussed with: patient      Timed:         Manual Therapy:    ***     mins  60916;     Therapeutic Exercise:    ***     mins  98486;     Neuromuscular Dejuan:    ***    mins  93005;    Therapeutic Activity:     ***     mins  90371;     Gait Training:      ***     mins  15685;     Ultrasound:     ***     mins  87907;    Ionto                               ***    mins   50087  Self Care                        ***     mins   59545  Canalith Repos    ***     mins 56765      Un-Timed:  Electrical Stimulation:    ***     mins  52661 ( );  Dry Needling     ***     mins self-pay  Traction     ***     mins 98665  Low Eval     ***     Mins  97851  Mod Eval     ***     Mins  30412  High Eval                       ***     Mins  81276  Re-Eval                           ***    mins  37261        Timed Treatment:   ***   mins   Total Treatment:     ***   mins    PT SIGNATURE:Daniel Fernandez PT, DPT  DATE TREATMENT INITIATED: 12/3/2021    {Certification Type:[id number]}  Certification Period: 3/3/2022  I certify that the therapy services are furnished while this patient is under my care.  The services outlined above are required by this patient, and will be reviewed every 90 days.     PHYSICIAN: Kapil Reaves MD      DATE: 12/01/2021     Please sign and return via fax to [phone]. Thank you, [facility].

## 2021-12-03 NOTE — PROGRESS NOTES
Re-Assessment / Re-Certification      Patient: Edwige Nichols   : 1970  Diagnosis/ICD-10 Code:  Pelvic floor dysfunction [M62.89]  Referring practitioner: Kapil Reaves MD  Date of Initial Visit: Type: THERAPY  Noted: 2021  Today's Date: 12/3/2021  Patient seen for 2 sessions      Subjective:   Edwige Nichols reports: Afternoon after she was seen last visit started feeling bad and escalated. Nauseous and feeling really bad. Then had a period which she hadn't had since August. Got so bad she went to the ER. Did CT scan. Saw Dr. Reaves this week and he thinks things just got angry with internal. PCP thought had UTI but didn't. More blood in urine than usual. Pretty much laying down the whole month of November. Urethra feels like going to explode and pelvic floor feels like bowling ball laying in it. Deep back pain that feels like insides, not like a pulled mm. Soupy and sick feeling in intestines and achey. Felt almost like the flu. Feels like body feels swollen even when doesn't look that way. History of strong negative response to colonoscopy and bladder instillation.     Subjective Questionnaire: female NIH-CPSI  Clinical Progress: worse  Home Program Compliance: N/A  Treatment has included: therapeutic activity    Objective   verbal consent obtained for external pelvic exam/treatment with declined need for second person in room     Palpation:   Supine:   Abdominals, Iliacus, psoas - moderate B with TrP throughout; 4/10 TTP  Adductors - mild B     See flowsheet for details of treatment following reassessment.         Assessment/Plan:   Pt had strong negative response with increased pain and feeling of sickness over the last month following internal exam at initial evaluation. She has not been treated since this time. She tolerated gentle manual with TPR today through abdominals and hip flexors. Will address external impairments first and assess response since Pt has history of flare in pain/symptoms  with colonoscopy and bladder instillation as reported today.       Goals:   STG's: 4 weeks  · Patient will report > 25% reduction in overall pain   · Patient will report > 25% improvement in urinary symptoms  · Patient will be able to perform HEP with minimal verbal cues     LTG's: By discharge  · Patient will report a decrease in pain to < 2/10  · Patient will report >75% improvement in urinary symptoms   · Patient will report an elimination of urinary urgency   · Patient will be independent with HEP        Progress toward previous goals: Not Met        Recommendations: Continue as planned  Timeframe: 3 months  Prognosis to achieve goals: fair    PT Signature: Daniel Fernandez PT      Based upon review of the patient's progress and continued therapy plan, it is my medical opinion that Edwige Nichols should continue physical therapy treatment at [facility]  [address] [place] [id number] [phone].    Signature: __________________________________  Kapil Reaves MD    [id number]/7741  Manual Therapy:    30     mins  49507;  Therapeutic Exercise:    13     mins  03665;     Neuromuscular Dejuan:    0    mins  62000;    Therapeutic Activity:     0     mins  67044;     Gait Trainin     mins  92205;     Ultrasound:     0     mins  61655;    Electrical Stimulation:    0     mins  61366 ( );  Dry Needling     0     mins self-pay    Timed Treatment:   43   mins   Total Treatment:     43   mins

## 2021-12-07 ENCOUNTER — TREATMENT (OUTPATIENT)
Dept: PHYSICAL THERAPY | Facility: CLINIC | Age: 51
End: 2021-12-07

## 2021-12-07 DIAGNOSIS — N94.89 HIGH-TONE PELVIC FLOOR DYSFUNCTION: ICD-10-CM

## 2021-12-07 DIAGNOSIS — M62.89 PELVIC FLOOR DYSFUNCTION: Primary | ICD-10-CM

## 2021-12-07 DIAGNOSIS — R10.2 PELVIC PAIN: ICD-10-CM

## 2021-12-07 PROCEDURE — 97140 MANUAL THERAPY 1/> REGIONS: CPT | Performed by: PHYSICAL THERAPIST

## 2021-12-07 PROCEDURE — 97530 THERAPEUTIC ACTIVITIES: CPT | Performed by: PHYSICAL THERAPIST

## 2021-12-07 NOTE — PROGRESS NOTES
Physical Therapy Daily Progress Note  Patient: Edwige Nichols   : 1970  Diagnosis/ICD-10 Code:  Pelvic floor dysfunction [M62.89]  Referring practitioner: Kapil Reaves MD  Date of Initial Visit: Type: THERAPY  Noted: 2021  Today's Date: 2021  Patient seen for 3 sessions      Subjective   Edwige Nichols reports a little uncomfortable for a bit after last treatment but resolved quickly. Feeling generally a little better and doing more stuff. May have overdone it. Feels real heavy in vaginal area. Urethral pain not quite as bad.     Pain Rating (0-10): 3      Objective   verbal consent obtained for external pelvic exam/treatment with declined need for second person in room     See Exercise, Manual, and Modality Logs for complete treatment.       Assessment/Plan  Pt with decreased pain following last treatment and no flare after manual therapy. Tolerated well today with mild discomfort. Implemented treatment to external PFM today and this was well tolerated. Pt to continue current stretching program at this time. Will assess response to external treatment and continue as tolerated.     Progress per Plan of Care         1130/1215   Timed:         Manual Therapy:    36     mins  27430;     Therapeutic Exercise:    0     mins  33156;     Neuromuscular Dejuan:    0    mins  92296;    Therapeutic Activity:     8     mins  11477;     Gait Trainin     mins  64646;     Ultrasound:     0     mins  93014;    Ionto                               0    mins   59430  Self Care                       0     mins   85481  Canalith Repos               0    mins  08102    Un-Timed:  Electrical Stimulation:    0     mins  57906 (MC );  Dry Needling     0     mins self-pay  Traction     0     mins 30655  Low Eval     0     Mins  75557  Mod Eval     0     Mins  37048  High Eval                       0     Mins  85696  Re-Eval                           0    mins  02101    Timed Treatment:   44   mins   Total  Treatment:     44   mins    Daniel Fernandez, PT  [place] License # [id number]  Physical Therapist

## 2021-12-13 ENCOUNTER — TELEPHONE (OUTPATIENT)
Dept: FAMILY MEDICINE CLINIC | Facility: CLINIC | Age: 51
End: 2021-12-13

## 2021-12-13 DIAGNOSIS — L30.9 DERMATITIS: Primary | ICD-10-CM

## 2021-12-13 RX ORDER — TRIAMCINOLONE ACETONIDE 0.25 MG/G
OINTMENT TOPICAL
Qty: 15 G | Refills: 1 | Status: SHIPPED | OUTPATIENT
Start: 2021-12-13 | End: 2022-10-19

## 2021-12-13 NOTE — TELEPHONE ENCOUNTER
----- Message from Edwige Nichols sent at 12/13/2021  9:11 AM EST -----  Regarding: Refill for Triamcinolone Acetonide Ointment 0.025%  You have prescribed me triamcinolone acetonide ointment 0.025% for irritation that I get from toilet paper & menstrual pads.  I have used it up & I am in need of more ointment please.  I did find out from my pelvic floor physical therapist that I am seeing through [facility] that’s this is a very common problem when you suffer with pelvic floor disorder.  I still use [facility] in [place].    Sincerely Edwige Nichols

## 2021-12-14 ENCOUNTER — TREATMENT (OUTPATIENT)
Dept: PHYSICAL THERAPY | Facility: CLINIC | Age: 51
End: 2021-12-14

## 2021-12-14 DIAGNOSIS — N94.89 HIGH-TONE PELVIC FLOOR DYSFUNCTION: ICD-10-CM

## 2021-12-14 DIAGNOSIS — M62.89 PELVIC FLOOR DYSFUNCTION: Primary | ICD-10-CM

## 2021-12-14 DIAGNOSIS — R10.2 PELVIC PAIN: ICD-10-CM

## 2021-12-14 PROCEDURE — 97140 MANUAL THERAPY 1/> REGIONS: CPT | Performed by: PHYSICAL THERAPIST

## 2021-12-14 PROCEDURE — 97530 THERAPEUTIC ACTIVITIES: CPT | Performed by: PHYSICAL THERAPIST

## 2021-12-14 NOTE — PROGRESS NOTES
Physical Therapy Daily Progress Note  Patient: Edwige Nichols   : 1970  Diagnosis/ICD-10 Code:  Pelvic floor dysfunction [M62.89]  Referring practitioner: Kapil Reaves MD  Date of Initial Visit: Type: THERAPY  Noted: 2021  Today's Date: 2021  Patient seen for 4 sessions      Subjective   Edwige Nichols reports did ball release and felt nauseous at first. Then next time just a tinge of it. Had an episode of nausea and vomiting once but doesn't feel sick. She tried penetration but not full intercourse and feels things felt irritated the next day but not during. She does not use lubricant. Reports she typically has sensitive skin with using toilet paper and pads.       Pain Rating (0-10): 4      Objective   verbal consent obtained for external pelvic exam/treatment with declined need for second person in room     See Exercise, Manual, and Modality Logs for complete treatment.       Assessment/Plan  Pt compliant with HEP and having visceral response with self tennis ball release that decreased with subsequent trials. She tolerated manual with moderate discomfort today and mild nausea. Pt educated on skin irritants in toilet paper, pads and on benefit of lubricant use. Provided samples of Good Clean Love today. Will continue manual and progress as tolerated to decrease pain.     Progress per Plan of Care         1005/1045   Timed:         Manual Therapy:    32     mins  05346;     Therapeutic Exercise:    0     mins  03453;     Neuromuscular Dejuan:    0    mins  66888;    Therapeutic Activity:     8     mins  28008;     Gait Trainin     mins  26702;     Ultrasound:     0     mins  68492;    Ionto                               0    mins   13829  Self Care                       0     mins   01835  Canalith Repos               0    mins  49519    Un-Timed:  Electrical Stimulation:    0     mins  15022 ( );  Dry Needling     0     mins self-pay  Traction     0     mins 13349  Low Eval      0     Mins  41681  Mod Eval     0     Mins  63525  High Eval                       0     Mins  04065  Re-Eval                           0    mins  73165    Timed Treatment:   40   mins   Total Treatment:     40   mins    Daniel Fernandez PT  [place] License # [id number]  Physical Therapist

## 2021-12-21 ENCOUNTER — TREATMENT (OUTPATIENT)
Dept: PHYSICAL THERAPY | Facility: CLINIC | Age: 51
End: 2021-12-21

## 2021-12-21 DIAGNOSIS — N94.89 HIGH-TONE PELVIC FLOOR DYSFUNCTION: ICD-10-CM

## 2021-12-21 DIAGNOSIS — R10.2 PELVIC PAIN: ICD-10-CM

## 2021-12-21 DIAGNOSIS — M62.89 PELVIC FLOOR DYSFUNCTION: Primary | ICD-10-CM

## 2021-12-21 PROCEDURE — 97530 THERAPEUTIC ACTIVITIES: CPT | Performed by: PHYSICAL THERAPIST

## 2021-12-21 PROCEDURE — 97140 MANUAL THERAPY 1/> REGIONS: CPT | Performed by: PHYSICAL THERAPIST

## 2021-12-21 NOTE — PROGRESS NOTES
Physical Therapy Daily Progress Note  Patient: Edwige Nichols   : 1970  Diagnosis/ICD-10 Code:  Pelvic floor dysfunction [M62.89]  Referring practitioner: Kapil Reaves MD  Date of Initial Visit: Type: THERAPY  Noted: 2021  Today's Date: 2021  Patient seen for 5 sessions      Subjective   Edwige Nichols reports strong response, nausea and pain that lasted for 2 days. Pain was 10/10. Didn't vomit. Feels better today. Not noticing those lines of pain around her belly button. Feeling more GI stuff going on. Reports vaginal moisturizer helps and she used lube with intercourse and had less irritation afterward.   Pain Rating (0-10): 1      Objective   verbal consent obtained for external pelvic exam/treatment with declined need for second person in room     See Exercise, Manual, and Modality Logs for complete treatment.         Assessment/Plan  Pt tolerates manual therapy with minimal discomfort but has nauseated response and increased pain following for 48 hours. Decreased pressure today with manual and Pt to continue current HEP at this time. Seems to have overall decrease in pain following the increased pain of 48 hours. Will continue manual and progress as tolerated to decrease pain symptoms.     Progress per Plan of Care         1003/1048   Timed:         Manual Therapy:    37     mins  22077;     Therapeutic Exercise:    0     mins  08973;     Neuromuscular Dejuan:    0    mins  60452;    Therapeutic Activity:     8     mins  08670;     Gait Trainin     mins  85301;     Ultrasound:     0     mins  88529;    Ionto                               0    mins   37249  Self Care                       0     mins   60475  Canalith Repos               0    mins  14394    Un-Timed:  Electrical Stimulation:    00     mins  44501 ( );  Dry Needling     0     mins self-pay  Traction     0     mins 06157  Low Eval     0     Mins  69189  Mod Eval     0     Mins  01636  High Eval                        0     Mins  57523  Re-Eval                           0    mins  51599    Timed Treatment:   45   mins   Total Treatment:     45   mins    Daniel Fernandez, PT  [place] License # [id number]  Physical Therapist

## 2022-01-14 ENCOUNTER — TREATMENT (OUTPATIENT)
Dept: PHYSICAL THERAPY | Facility: CLINIC | Age: 52
End: 2022-01-14

## 2022-01-14 DIAGNOSIS — R10.2 PELVIC PAIN: ICD-10-CM

## 2022-01-14 DIAGNOSIS — M62.89 PELVIC FLOOR DYSFUNCTION: Primary | ICD-10-CM

## 2022-01-14 DIAGNOSIS — N94.89 HIGH-TONE PELVIC FLOOR DYSFUNCTION: ICD-10-CM

## 2022-01-14 PROCEDURE — 97140 MANUAL THERAPY 1/> REGIONS: CPT | Performed by: PHYSICAL THERAPIST

## 2022-01-14 PROCEDURE — 97530 THERAPEUTIC ACTIVITIES: CPT | Performed by: PHYSICAL THERAPIST

## 2022-01-14 NOTE — PROGRESS NOTES
Re-Assessment / Re-Certification        Patient: Edwige Nichols   : 1970  Diagnosis/ICD-10 Code:  Pelvic floor dysfunction [M62.89]  Referring practitioner: Kapil Reaves MD  Date of Initial Visit: Type: THERAPY  Noted: 2021  Today's Date: 2022  Patient seen for 6 sessions      Subjective:   Edwige Nichols reports: she has done okay. Feels she may have a new issue. Her son has stressed her out due to starting school.   Feel really tight in L groin and down thigh and some aching in her groin area like going to have a period. Abdominal pain is better. Pain 4/10 at most over the last week. After last visit used icy hot afterward and helped with how felt at first after. Pain improvement 50%, urinary symptoms 50%      Subjective Questionnaire: female NIH-CPSI  Clinical Progress: improved  Home Program Compliance: Yes  Treatment has included: therapeutic exercise, neuromuscular re-education, manual therapy, therapeutic activity and moist heat      Objective   verbal consent obtained for internal pelvic exam/treatment with declined need for second person in room      Palpation:   Supine:   Abdominals, Iliacus, psoas - mild- moderate B   Adductors - mild B       See flowsheet for details of treatment following reassessment.     Assessment/Plan  Progress to physical therapy goals is good.She reports improved pelvic pain, both severity, frequency and duration. She has met 3/3 short term goals and 0/4 long term goals. She will benefit from continued skilled physical therapy to address remaining impairments and functional limitations.   Progress toward previous goals: Partially Met    Goals:   STG's: 4 weeks  · Patient will report > 25% reduction in overall pain - met  · Patient will report > 25% improvement in urinary symptoms - met  · Patient will be able to perform HEP with minimal verbal cues - met     LTG's: By discharge  · Patient will report a decrease in pain to < 2/10  · Patient will report >75%  improvement in urinary symptoms   · Patient will report an elimination of urinary urgency   · Patient will be independent with HEP         Recommendations: Continue as planned  Timeframe: 2 months  Prognosis to achieve goals: good    PT Signature: Daniel Fernandez PT      Based upon review of the patient's progress and continued therapy plan, it is my medical opinion that Edwige Nichols should continue physical therapy treatment at [facility]  [address] [place] [address] [phone].    Signature: __________________________________  Kapil Reaves MD    0945/1030  Manual Therapy:    37     mins  91272;  Therapeutic Exercise:    0     mins  65456;     Neuromuscular Dejuan:    0    mins  30148;    Therapeutic Activity:     8     mins  85404;     Gait Trainin     mins  85652;     Ultrasound:     0     mins  75150;    Electrical Stimulation:    0     mins  94386 ( );  Dry Needling     0     mins self-pay    Timed Treatment:   45   mins   Total Treatment:     45   mins

## 2022-01-21 ENCOUNTER — TREATMENT (OUTPATIENT)
Dept: PHYSICAL THERAPY | Facility: CLINIC | Age: 52
End: 2022-01-21

## 2022-01-21 DIAGNOSIS — M62.89 PELVIC FLOOR DYSFUNCTION: Primary | ICD-10-CM

## 2022-01-21 DIAGNOSIS — N94.89 HIGH-TONE PELVIC FLOOR DYSFUNCTION: ICD-10-CM

## 2022-01-21 DIAGNOSIS — R10.2 PELVIC PAIN: ICD-10-CM

## 2022-01-21 PROCEDURE — 97140 MANUAL THERAPY 1/> REGIONS: CPT | Performed by: PHYSICAL THERAPIST

## 2022-01-21 NOTE — PROGRESS NOTES
Physical Therapy Daily Progress Note  Patient: Edwige Nichols   : 1970  Diagnosis/ICD-10 Code:  Pelvic floor dysfunction [M62.89]  Referring practitioner: Kapil Reaves MD  Date of Initial Visit: Type: THERAPY  Noted: 2021  Today's Date: 2022  Patient seen for 7 sessions      Subjective   Edwige Nichols reports still feels achey in legs and gut area like going to have a period but not. A little more discomfort today. Doing stretches is helping and thinks working on legs last week helps. Feels better but not 100%. Doing more activities now because able to.     Pain Rating (0-10): 3      Objective   verbal consent obtained for external pelvic exam/treatment with declined need for second person in room     See Exercise, Manual, and Modality Logs for complete treatment.         Assessment/Plan  Pt with decreased pain overall. She is compliant with HEP and is to continue current stretching at this time. Pt tolerated manual with mild discomfort that decreased with MFR and TPR. Pt will continue to benefit from skilled pelvic PT to decrease pain symptoms.     Progress per Plan of Care         1053/1135   Timed:         Manual Therapy:    38     mins  28986;     Therapeutic Exercise:    0     mins  97287;     Neuromuscular Dejuan:    0    mins  07597;    Therapeutic Activity:     5     mins  45075;     Gait Trainin     mins  42970;     Ultrasound:     0     mins  33229;    Ionto                               0    mins   64237  Self Care                       0     mins   70683  Canalith Repos               0    mins  06740    Un-Timed:  Electrical Stimulation:    0     mins  24136 (MC );  Dry Needling     0     mins self-pay  Traction     0     mins 84804  Low Eval     0     Mins  42680  Mod Eval     0     Mins  74074  High Eval                       0     Mins  57713  Re-Eval                           0    mins  72723    Timed Treatment:   43   mins   Total Treatment:     43   mins    Daniel  Rebecca, PT  [place] License # [id number]  Physical Therapist

## 2022-02-11 ENCOUNTER — TREATMENT (OUTPATIENT)
Dept: PHYSICAL THERAPY | Facility: CLINIC | Age: 52
End: 2022-02-11

## 2022-02-11 DIAGNOSIS — R10.2 PELVIC PAIN: ICD-10-CM

## 2022-02-11 DIAGNOSIS — M62.89 PELVIC FLOOR DYSFUNCTION: Primary | ICD-10-CM

## 2022-02-11 DIAGNOSIS — N94.89 HIGH-TONE PELVIC FLOOR DYSFUNCTION: ICD-10-CM

## 2022-02-11 PROCEDURE — 97140 MANUAL THERAPY 1/> REGIONS: CPT | Performed by: PHYSICAL THERAPIST

## 2022-02-11 NOTE — PROGRESS NOTES
Physical Therapy Daily Progress Note  Patient: Edwige iNchols   : 1970  Diagnosis/ICD-10 Code:  Pelvic floor dysfunction [M62.89]  Referring practitioner: Kapil Reaves MD  Date of Initial Visit: Type: THERAPY  Noted: 2021  Today's Date: 2022  Patient seen for 8 sessions      Subjective   Edwige Nichols reports was feeling better in lower abdomen but noticing more lower abdominal pains last 2 days that hadn't been happening with coming regularly. Urethra has been pretty good. Small flare with sugar a couple of days but better now.     Pain Rating (0-10): 2      Objective   verbal consent obtained for external pelvic exam/treatment with declined need for second person in room     See Exercise, Manual, and Modality Logs for complete treatment.       Assessment/Plan  Pt with increased pain with decreased frequency of treatment. She tolerated manual with mild discomfort that decreased with TPR. Will continue manual and progress as tolerated to decrease pain symptoms. Pt responded well to L hip mobilizations with increased hip ER following treatment.     Progress per Plan of Care         1030/1115   Timed:         Manual Therapy:    38     mins  78831;     Therapeutic Exercise:    0     mins  33421;     Neuromuscular Dejuan:    0    mins  80143;    Therapeutic Activity:     5     mins  82837;     Gait Trainin     mins  14313;     Ultrasound:     0     mins  71329;    Ionto                               0    mins   35382  Self Care                       0     mins   88890  Canalith Repos               0    mins  15959    Un-Timed:  Electrical Stimulation:    0     mins  58151 ( );  Dry Needling     0     mins self-pay  Traction     0     mins 41620  Low Eval     0     Mins  16404  Mod Eval     0     Mins  04600  High Eval                       0     Mins  53001  Re-Eval                           0    mins  17365    Timed Treatment:   43   mins   Total Treatment:     43   mins    Daniel  Rebecca, PT  [place] License # [id number]  Physical Therapist

## 2022-02-14 ENCOUNTER — TRANSCRIBE ORDERS (OUTPATIENT)
Dept: ADMINISTRATIVE | Facility: HOSPITAL | Age: 52
End: 2022-02-14

## 2022-02-14 DIAGNOSIS — Z12.31 VISIT FOR SCREENING MAMMOGRAM: Primary | ICD-10-CM

## 2022-02-18 ENCOUNTER — TREATMENT (OUTPATIENT)
Dept: PHYSICAL THERAPY | Facility: CLINIC | Age: 52
End: 2022-02-18

## 2022-02-18 DIAGNOSIS — R10.2 PELVIC PAIN: Primary | ICD-10-CM

## 2022-02-18 DIAGNOSIS — N94.89 HIGH-TONE PELVIC FLOOR DYSFUNCTION: ICD-10-CM

## 2022-02-18 DIAGNOSIS — M62.89 PELVIC FLOOR DYSFUNCTION: ICD-10-CM

## 2022-02-18 PROCEDURE — 97110 THERAPEUTIC EXERCISES: CPT | Performed by: PHYSICAL THERAPIST

## 2022-02-18 PROCEDURE — 97140 MANUAL THERAPY 1/> REGIONS: CPT | Performed by: PHYSICAL THERAPIST

## 2022-02-18 NOTE — PROGRESS NOTES
Re-Assessment / Re-Certification        Patient: Edwige Nichols   : 1970  Diagnosis/ICD-10 Code:  Pelvic pain [R10.2]  Referring practitioner: Kapil Reaves MD  Date of Initial Visit: Type: THERAPY  Noted: 2021  Today's Date: 2022  Patient seen for 9 sessions      Subjective:   Edwige Nichols reports: decent week. Rates improvement in pain 60%. Less frequency and severity. 2/10 over last week at most. Bladder 60% as well. Frequency every 2 hours maybe - not really thinking about it as much. Urgency is better. Doing okay with sex if careful, tolerating better and feels more able to be doing furniture now.          Subjective Questionnaire: female NIH-CPSI  Clinical Progress: improved  Home Program Compliance: Yes  Treatment has included: therapeutic exercise, neuromuscular re-education, manual therapy, therapeutic activity and moist heat        Objective   verbal consent obtained for external pelvic exam/treatment with declined need for second person in room      Palpation:   Supine:   Abdominals, Iliacus, psoas - mild B   Adductors - mild B        See flowsheet for details of treatment following reassessment.      Assessment/Plan  Progress to physical therapy goals is good.She reports improved pelvic pain, both severity, frequency and duration. She has met 3/3 short term goals and 0/4 long term goals. She will benefit from continued skilled physical therapy to address remaining impairments and functional limitations.   Progress toward previous goals: Partially Met     Goals:   STG's: 4 weeks  · Patient will report > 25% reduction in overall pain - met  · Patient will report > 25% improvement in urinary symptoms - met  · Patient will be able to perform HEP with minimal verbal cues - met     LTG's: By discharge  · Patient will report a decrease in pain to < 2/10  · Patient will report >75% improvement in urinary symptoms   · Patient will report an elimination of urinary urgency   · Patient will  be independent with HEP                   Recommendations: Continue as planned  Timeframe: 2 months  Prognosis to achieve goals: good     PT Signature: Daniel Fernandez PT            Based upon review of the patient's progress and continued therapy plan, it is my medical opinion that Edwige Nichols should continue physical therapy treatment at [facility]  [address] [place] [id number] [phone].    Signature: __________________________________    PHYSICIAN: Kapil Reaves MD  NPI: [id number]                                        1030/1115  Manual Therapy:    37     mins  42745;  Therapeutic Exercise:    0     mins  56275;     Neuromuscular Dejuan:    0    mins  67495;    Therapeutic Activity:     8     mins  66773;     Gait Trainin     mins  59161;     Ultrasound:     0     mins  03903;    Electrical Stimulation:    0     mins  51193 ( );  Dry Needling     0     mins self-pay    Timed Treatment:   45   mins   Total Treatment:     45   mins

## 2022-03-11 ENCOUNTER — OFFICE VISIT (OUTPATIENT)
Dept: OBSTETRICS AND GYNECOLOGY | Facility: CLINIC | Age: 52
End: 2022-03-11

## 2022-03-11 VITALS — SYSTOLIC BLOOD PRESSURE: 140 MMHG | WEIGHT: 214 LBS | DIASTOLIC BLOOD PRESSURE: 80 MMHG | BODY MASS INDEX: 34.56 KG/M2

## 2022-03-11 DIAGNOSIS — E66.9 OBESITY (BMI 30-39.9): ICD-10-CM

## 2022-03-11 DIAGNOSIS — Z01.419 PAP TEST, AS PART OF ROUTINE GYNECOLOGICAL EXAMINATION: Primary | ICD-10-CM

## 2022-03-11 DIAGNOSIS — N90.89 VULVAR IRRITATION: ICD-10-CM

## 2022-03-11 LAB
KOH PREP NAIL: NORMAL
WET PREP GENITAL: NEGATIVE

## 2022-03-11 PROCEDURE — 87210 SMEAR WET MOUNT SALINE/INK: CPT | Performed by: NURSE PRACTITIONER

## 2022-03-11 PROCEDURE — 87220 TISSUE EXAM FOR FUNGI: CPT | Performed by: NURSE PRACTITIONER

## 2022-03-11 PROCEDURE — 99396 PREV VISIT EST AGE 40-64: CPT | Performed by: NURSE PRACTITIONER

## 2022-03-11 RX ORDER — CETIRIZINE HYDROCHLORIDE 10 MG/1
10 TABLET ORAL DAILY
COMMUNITY

## 2022-03-11 NOTE — PROGRESS NOTES
GYN Annual Exam     CC - Here for annual exam.        HPI  Edwige Nichols is a 51 y.o. female, , who presents for annual well woman exam.  She is perimenopausal. Last period was 2021 Patient denies vaginal bleeding. ..  Patient reports problems with: vaginal irritation has tried numerous products as recommended by her pelvic physical therapist. She has gotten relief with triamcinolone that was given by another provider. She denies discharge or itching, describes it as burning especially after IC or vaginal exams . There were no changes to her medical or surgical history since her last visit.. Partner Status: Marital Status: .  New Partners since last visit: no.      Additional OB/GYN History   Current contraception: contraceptive methods: None  Desires to: do not start contraception  On HRT? No  Last Pap : 10/20/2021. Results: neg  Last Completed Pap Smear          Ordered - PAP SMEAR (Every 3 Years) Ordered on 3/11/2022    10/20/2020  Pap IG, HPV-hr              History of abnormal Pap smear: no  Family history of uterine, colon, breast, or ovarian cancer: yes - EMERALD lorenzo at 80  Performs monthly Self-Breast Exam: yes  Last mammogram: 2021. Done at [facility].    Last Completed Mammogram          Scheduled - MAMMOGRAM (Every 2 Years) Scheduled for 2021  Mammo Screening Digital Tomosynthesis Bilateral With CAD    2020  Mammo Screening Digital Tomosynthesis Bilateral With CAD    2019  Mammo Screening Digital Tomosynthesis Bilateral With CAD    2017  Mammo Screening Digital Tomosynthesis Bilateral With CAD    2016  Mammo Screening Digital Tomosynthesis Bilateral With CAD    Only the first 5 history entries have been loaded, but more history exists.              Last colonoscopy: 2017 neg  Last Completed Colonoscopy          COLORECTAL CANCER SCREENING (COLONOSCOPY - Every 10 Years) Next due on 2017  SCANNED - COLONOSCOPY               Last DEXA: never  Exercises Regularly: no  Feelings of Anxiety or Depression: no      Tobacco Usage?: No   OB History        1    Para   1    Term   1            AB        Living           SAB        IAB        Ectopic        Molar        Multiple        Live Births                    Health Maintenance   Topic Date Due   • TDAP/TD VACCINES (1 - Tdap) Never done   • HEPATITIS C SCREENING  Never done   • ZOSTER VACCINE (1 of 2) Never done   • INFLUENZA VACCINE  Never done   • ANNUAL PHYSICAL  10/17/2021   • COVID-19 Vaccine (3 - Booster for Pfizer series) 2022   • Annual Gynecologic Pelvic and Breast Exam  2023   • MAMMOGRAM  2023   • PAP SMEAR  10/20/2023   • COLORECTAL CANCER SCREENING  2027   • Pneumococcal Vaccine 0-64  Aged Out       The additional following portions of the patient's history were reviewed and updated as appropriate: allergies, current medications, past family history, past medical history, past social history, past surgical history and problem list.    Review of Systems   Constitutional: Negative.    HENT: Negative.    Eyes: Negative.    Respiratory: Negative.    Cardiovascular: Negative.    Gastrointestinal: Negative.    Endocrine: Negative.    Genitourinary:        Vulvar irritation   Musculoskeletal: Negative.    Skin: Negative.    Allergic/Immunologic: Negative.    Neurological: Negative.    Hematological: Negative.    Psychiatric/Behavioral: Negative.        I have reviewed and agree with the HPI, ROS, and historical information as entered above. DORI Brandon    Objective   /80   Wt 97.1 kg (214 lb)   BMI 34.56 kg/m²     Physical Exam  Vitals and nursing note reviewed. Exam conducted with a chaperone present.   Constitutional:       Appearance: She is well-developed.   HENT:      Head: Normocephalic and atraumatic.   Neck:      Thyroid: No thyroid mass or thyromegaly.   Cardiovascular:      Rate and Rhythm: Normal rate and regular  rhythm.      Heart sounds: No murmur heard.  Pulmonary:      Effort: Pulmonary effort is normal. No retractions.      Breath sounds: Normal breath sounds. No wheezing, rhonchi or rales.   Chest:      Chest wall: No mass or tenderness.   Breasts:      Right: Normal. No mass, nipple discharge, skin change or tenderness.      Left: Normal. No mass, nipple discharge, skin change or tenderness.       Abdominal:      General: Bowel sounds are normal.      Palpations: Abdomen is soft. Abdomen is not rigid. There is no mass.      Tenderness: There is no abdominal tenderness. There is no guarding.      Hernia: No hernia is present. There is no hernia in the left inguinal area.   Genitourinary:     General: Normal vulva.      Labia:         Right: No rash, tenderness or lesion.         Left: No rash, tenderness or lesion.       Vagina: Normal. No vaginal discharge or lesions.      Cervix: Normal.      Uterus: Normal. Not enlarged and not tender.       Adnexa:         Right: No mass or tenderness.          Left: No mass or tenderness.        Rectum: No external hemorrhoid.   Musculoskeletal:      Cervical back: Normal range of motion. No muscular tenderness.   Neurological:      Mental Status: She is alert and oriented to person, place, and time.   Psychiatric:         Behavior: Behavior normal.            Assessment and Plan    Problem List Items Addressed This Visit    None     Visit Diagnoses     Pap test, as part of routine gynecological examination    -  Primary    Relevant Orders    Pap IG, Rfx HPV ASCU    Vulvar irritation        Relevant Orders    POC Wet Prep    POC KOH Prep    Obesity (BMI 30-39.9)              1. GYN annual well woman exam.   2. Reviewed monthly self breast exams.  Instructed to call with lumps, pain, or breast discharge.  Yearly mammograms ordered.  3. Recommended use of Vitamin D and getting adequate calcium in her diet. (1500mg)  4. Reviewed exercise as a preventative health measures.    5. Colonoscopy recommended.  6. Reviewed [facility] ovarian cancer screening program.   7. Vulvar exam normal. Wet prep negative. Getting some relief with triamcinolone, advised to use sparingly. We briefly discussed trying estrogen cream, trying aquaphor to vulva, coconut oil.   8. Return in about 1 year (around 3/11/2023) for Annual physical.     DORI Brandon  03/11/2022

## 2022-03-22 DIAGNOSIS — Z01.419 PAP TEST, AS PART OF ROUTINE GYNECOLOGICAL EXAMINATION: ICD-10-CM

## 2022-03-31 ENCOUNTER — TREATMENT (OUTPATIENT)
Dept: PHYSICAL THERAPY | Facility: CLINIC | Age: 52
End: 2022-03-31

## 2022-03-31 DIAGNOSIS — R10.2 PELVIC PAIN: Primary | ICD-10-CM

## 2022-03-31 DIAGNOSIS — N94.89 HIGH-TONE PELVIC FLOOR DYSFUNCTION: ICD-10-CM

## 2022-03-31 DIAGNOSIS — M62.89 PELVIC FLOOR DYSFUNCTION: ICD-10-CM

## 2022-03-31 PROCEDURE — 97140 MANUAL THERAPY 1/> REGIONS: CPT | Performed by: PHYSICAL THERAPIST

## 2022-03-31 PROCEDURE — 97110 THERAPEUTIC EXERCISES: CPT | Performed by: PHYSICAL THERAPIST

## 2022-04-08 ENCOUNTER — TREATMENT (OUTPATIENT)
Dept: PHYSICAL THERAPY | Facility: CLINIC | Age: 52
End: 2022-04-08

## 2022-04-08 DIAGNOSIS — N94.89 HIGH-TONE PELVIC FLOOR DYSFUNCTION: ICD-10-CM

## 2022-04-08 DIAGNOSIS — R10.2 PELVIC PAIN: Primary | ICD-10-CM

## 2022-04-08 DIAGNOSIS — M62.89 PELVIC FLOOR DYSFUNCTION: ICD-10-CM

## 2022-04-08 PROCEDURE — 97140 MANUAL THERAPY 1/> REGIONS: CPT | Performed by: PHYSICAL THERAPIST

## 2022-04-08 PROCEDURE — 97530 THERAPEUTIC ACTIVITIES: CPT | Performed by: PHYSICAL THERAPIST

## 2022-04-11 NOTE — PROGRESS NOTES
Physical Therapy Daily Progress Note  Patient: Edwige Nichols   : 1970  Diagnosis/ICD-10 Code:  Pelvic pain [R10.2]  Referring practitioner: Kapil Reaves MD  Date of Initial Visit: Type: THERAPY  Noted: 2021  Today's Date: 4/10/2022  Patient seen for 11 sessions      Subjective   dEwige Nichols reports decreased abdominal pain following last treatment.   Pain Rating (0-10): 4      Objective   verbal consent obtained for external pelvic exam/treatment with declined need for second person in room     See Exercise, Manual, and Modality Logs for complete treatment.         Assessment/Plan  Pt compliant with HEP and is to continue at this time. She tolerated manual with mild discomfort that decreased with MFR and TPR. Will continue manual and progress as tolerated to decrease pain.     Progress per Plan of Care         1030/1115   Timed:         Manual Therapy:    37     mins  28042;     Therapeutic Exercise:    0     mins  21832;     Neuromuscular Dejuan:    0    mins  72085;    Therapeutic Activity:     8     mins  81192;     Gait Trainin     mins  21568;     Ultrasound:     0     mins  61568;    Ionto                               0    mins   47695  Self Care                       0     mins   05001  Canalith Repos               0    mins  67983    Un-Timed:  Electrical Stimulation:    0     mins  01451 (MC );  Dry Needling     0     mins self-pay  Traction     0     mins 48743  Low Eval     0     Mins  71002  Mod Eval     0     Mins  21638  High Eval                       0     Mins  44750  Re-Eval                           0    mins  01123    Timed Treatment:   45   mins   Total Treatment:     45   mins    Daniel Fernandez PT  [place] License # [id number]  Physical Therapist

## 2022-04-19 ENCOUNTER — OFFICE VISIT (OUTPATIENT)
Dept: UROLOGY | Facility: CLINIC | Age: 52
End: 2022-04-19

## 2022-04-19 VITALS
HEART RATE: 77 BPM | TEMPERATURE: 96.7 F | HEIGHT: 66 IN | WEIGHT: 214 LBS | DIASTOLIC BLOOD PRESSURE: 86 MMHG | OXYGEN SATURATION: 99 % | SYSTOLIC BLOOD PRESSURE: 146 MMHG | BODY MASS INDEX: 34.39 KG/M2

## 2022-04-19 DIAGNOSIS — N30.10 INTERSTITIAL CYSTITIS: Primary | ICD-10-CM

## 2022-04-19 LAB
BILIRUB BLD-MCNC: NEGATIVE MG/DL
CLARITY, POC: CLEAR
COLOR UR: ABNORMAL
EXPIRATION DATE: ABNORMAL
GLUCOSE UR STRIP-MCNC: NEGATIVE MG/DL
KETONES UR QL: NEGATIVE
LEUKOCYTE EST, POC: NEGATIVE
Lab: ABNORMAL
NITRITE UR-MCNC: NEGATIVE MG/ML
PH UR: 5.5 [PH] (ref 5–8)
PROT UR STRIP-MCNC: NEGATIVE MG/DL
RBC # UR STRIP: ABNORMAL /UL
SP GR UR: 1.01 (ref 1–1.03)
UROBILINOGEN UR QL: NORMAL

## 2022-04-19 PROCEDURE — 81003 URINALYSIS AUTO W/O SCOPE: CPT | Performed by: PHYSICIAN ASSISTANT

## 2022-04-19 PROCEDURE — 99213 OFFICE O/P EST LOW 20 MIN: CPT | Performed by: PHYSICIAN ASSISTANT

## 2022-04-19 NOTE — PROGRESS NOTES
Chief Complaint   Patient presents with   • Follow-up     5 month follow up.   • Cystitis        HPI  Ms. Nichols is a 51 y.o. female with history of IC who presents for follow up.     At this visit, states she has had a lot of benefit from PFPT and is overall feeling much better. IC cocktail initially hurt, but eventually led to some symptom improvement.     Past Medical History:   Diagnosis Date   • B12 deficiency 04/02/2021   • Costochondritis    • Depression    • Fibromyalgia    • IBS (irritable bowel syndrome)    • IC (interstitial cystitis)    • Muscle disorder    • Muscle pain    • Pelvic floor dysfunction 04/26/2021   • Persistent postural-perceptual dizziness    • Sacro-iliac pain     joint dysfunction   • Screening breast examination     Slef; Admits   • Sjogren's disease (HCC)    • Urethral syndrome    • Vitamin D deficiency 04/02/2021   • Vocal cord dysfunction        Past Surgical History:   Procedure Laterality Date   • BREAST BIOPSY Left    • COLONOSCOPY     • LAPAROSCOPIC CHOLECYSTECTOMY     • REDUCTION MAMMAPLASTY Bilateral 2014   • WISDOM TOOTH EXTRACTION           Current Outpatient Medications:   •  Carboxymethylcellulose Sodium (EYE DROPS OP), Apply  to eye(s) as directed by provider., Disp: , Rfl:   •  celecoxib (CeleBREX) 200 MG capsule, Take 1 capsule by mouth Daily., Disp: , Rfl:   •  cetirizine (zyrTEC) 10 MG tablet, Take 10 mg by mouth Daily., Disp: , Rfl:   •  Cholecalciferol 125 MCG (5000 UT) tablet, Daily., Disp: , Rfl:   •  fluticasone (FLONASE) 50 MCG/ACT nasal spray, 2 sprays into the nostril(s) as directed by provider Daily., Disp: , Rfl:   •  lansoprazole (PREVACID) 15 MG capsule, Take 15 mg by mouth As Needed., Disp: , Rfl:   •  ondansetron ODT (Zofran ODT) 8 MG disintegrating tablet, Place 1 tablet on the tongue Every 8 (Eight) Hours As Needed for Nausea., Disp: 20 tablet, Rfl: 1  •  triamcinolone (KENALOG) 0.025 % ointment, Apply BID PRN, Disp: 15 g, Rfl: 1  •  Triamcinolone  "Acetonide (NASACORT) 55 MCG/ACT nasal inhaler, Daily., Disp: , Rfl:   •  Calcium Glycerophosphate (PRELIEF PO), Take  by mouth., Disp: , Rfl:   •  celecoxib (CeleBREX) 200 MG capsule, Take 200 mg by mouth Daily., Disp: , Rfl:   •  hydrOXYzine (ATARAX) 25 MG tablet, Take 1 tablet by mouth every night at bedtime., Disp: 30 tablet, Rfl: 11  •  solifenacin (VESIcare) 10 MG tablet, Take 1 tablet by mouth Daily., Disp: 30 tablet, Rfl: 2  •  traMADol (ULTRAM) 50 MG tablet, Take 1 tablet by mouth Every 6 (Six) Hours As Needed for Moderate Pain  or Severe Pain ., Disp: 15 tablet, Rfl: 0  •  vitamin D (ERGOCALCIFEROL) 1.25 MG (00501 UT) capsule capsule, Take 50,000 Units by mouth 1 (One) Time Per Week., Disp: , Rfl:      Physical Exam  Visit Vitals  /86 (BP Location: Left arm, Patient Position: Sitting, Cuff Size: Adult)   Pulse 77   Temp 96.7 °F (35.9 °C) (Infrared)   Ht 167.6 cm (65.98\")   Wt 97.1 kg (214 lb)   SpO2 99%   BMI 34.56 kg/m²       Labs  Brief Urine Lab Results  (Last result in the past 365 days)      Color   Clarity   Blood   Leuk Est   Nitrite   Protein   CREAT   Urine HCG        04/19/22 1029 Straw   Clear   Trace   Negative   Negative   Negative                 Lab Results   Component Value Date    GLUCOSE 117 (H) 11/24/2021    CALCIUM 9.2 11/24/2021     (L) 11/24/2021    K 3.7 11/24/2021    CO2 21.0 (L) 11/24/2021     11/24/2021    BUN 13 11/24/2021    CREATININE 0.98 11/24/2021    EGFRIFAFRI 79 01/03/2020    EGFRIFNONA 60 (L) 11/24/2021    BCR 13.3 11/24/2021    ANIONGAP 14.0 11/24/2021       Lab Results   Component Value Date    WBC 10.07 11/23/2021    HGB 12.7 11/23/2021    HCT 39.5 11/23/2021    MCV 83.0 11/23/2021     11/23/2021       Urine Culture    Urine Culture 4/26/21 5/25/21 11/16/21   Urine Culture Final report Final report Final report              Assessment  51 y.o. female with history of interstitial cystitis, currently undergoing pelvic floor physical therapy.  Her " most recent flare was also treated with an IC cocktail.  These 2 interventions have helped and she believes that she is still improving.  In particular pelvic floor physical therapy has helped her and seems to be helping her the most of her time.    Plan  1.  Continue PF PT  2.  Follow-up in 6 months for reassessment or sooner as needed

## 2022-04-22 ENCOUNTER — HOSPITAL ENCOUNTER (OUTPATIENT)
Dept: MAMMOGRAPHY | Facility: HOSPITAL | Age: 52
Discharge: HOME | End: 2022-04-22
Admitting: NURSE PRACTITIONER

## 2022-04-22 DIAGNOSIS — Z12.31 VISIT FOR SCREENING MAMMOGRAM: ICD-10-CM

## 2022-04-22 PROCEDURE — 77067 SCR MAMMO BI INCL CAD: CPT

## 2022-04-22 PROCEDURE — 77067 SCR MAMMO BI INCL CAD: CPT | Performed by: RADIOLOGY

## 2022-04-22 PROCEDURE — 77063 BREAST TOMOSYNTHESIS BI: CPT | Performed by: RADIOLOGY

## 2022-04-22 PROCEDURE — 77063 BREAST TOMOSYNTHESIS BI: CPT

## 2022-04-29 ENCOUNTER — TREATMENT (OUTPATIENT)
Dept: PHYSICAL THERAPY | Facility: CLINIC | Age: 52
End: 2022-04-29

## 2022-04-29 DIAGNOSIS — M62.89 PELVIC FLOOR DYSFUNCTION: ICD-10-CM

## 2022-04-29 DIAGNOSIS — R10.2 PELVIC PAIN: Primary | ICD-10-CM

## 2022-04-29 DIAGNOSIS — N94.89 HIGH-TONE PELVIC FLOOR DYSFUNCTION: ICD-10-CM

## 2022-04-29 PROCEDURE — 97140 MANUAL THERAPY 1/> REGIONS: CPT | Performed by: PHYSICAL THERAPIST

## 2022-04-29 PROCEDURE — 97530 THERAPEUTIC ACTIVITIES: CPT | Performed by: PHYSICAL THERAPIST

## 2022-05-02 NOTE — PROGRESS NOTES
Physical Therapy Daily Progress Note  Patient: Edwige Nichols   : 1970  Diagnosis/ICD-10 Code:  Pelvic pain [R10.2]  Referring practitioner: Kapil Reaves MD  Date of Initial Visit: Type: THERAPY  Noted: 2021  Today's Date: 2022  Patient seen for 12 sessions    Edwige Nichols reports decreased abdominal pain following last treatment. She has had some increased pain with working more refinishing furniture.   Pain Rating (0-10): 4        Objective   verbal consent obtained for external pelvic exam/treatment with declined need for second person in room      See Exercise, Manual, and Modality Logs for complete treatment.            Assessment/Plan  Pt compliant with HEP and is to continue at this time. She tolerated manual with mild discomfort that decreased with MFR and TPR. Educated Pt on benefits of internal treatment and implemented external to STP today. Will continue manual and progress as tolerated to decrease pain.   Progress per Plan of Care         1015/1100   Timed:         Manual Therapy:    32     mins  19477;     Therapeutic Exercise:    0     mins  22078;     Neuromuscular Dejuan:    0    mins  40904;    Therapeutic Activity:     8     mins  96811;     Gait Trainin     mins  45639;     Ultrasound:     0     mins  55050;    Ionto                               0    mins   68811  Self Care                       0     mins   34896  Canalith Repos               0    mins  24497    Un-Timed:  Electrical Stimulation:    0     mins  62625 ( );  Dry Needling     0     mins self-pay  Traction     0     mins 82157  Low Eval     0     Mins  15633  Mod Eval     0     Mins  90862  High Eval                       0     Mins  42788  Re-Eval                           0    mins  96194    Timed Treatment:   40   mins   Total Treatment:     40   mins    Daniel Fernandez PT  [place] License # [id number]  Physical Therapist

## 2022-05-05 ENCOUNTER — TREATMENT (OUTPATIENT)
Dept: PHYSICAL THERAPY | Facility: CLINIC | Age: 52
End: 2022-05-05

## 2022-05-05 DIAGNOSIS — R10.2 PELVIC PAIN: Primary | ICD-10-CM

## 2022-05-05 DIAGNOSIS — M62.89 PELVIC FLOOR DYSFUNCTION: ICD-10-CM

## 2022-05-05 DIAGNOSIS — N94.89 HIGH-TONE PELVIC FLOOR DYSFUNCTION: ICD-10-CM

## 2022-05-05 PROCEDURE — 97140 MANUAL THERAPY 1/> REGIONS: CPT | Performed by: PHYSICAL THERAPIST

## 2022-05-05 NOTE — PROGRESS NOTES
Re-Assessment / Re-Certification        Patient: Edwige Nichols   : 1970  Diagnosis/ICD-10 Code:  Pelvic pain [R10.2]  Referring practitioner: Kapil Reaves MD  Date of Initial Visit: Type: THERAPY  Noted: 2021  Today's Date: 2022  Patient seen for 13 sessions      Subjective:   Edwige Nichols reports: having a flare up with costeochondritis and in pelvic area. Did more with activities with furniture this weekend. Pain in pelvic area has been up to 6/10. Since standing more having more urgency; can wait about an hour before has to go.         Subjective Questionnaire: VQ  Clinical Progress: worse  Home Program Compliance: Yes  Treatment has included: therapeutic exercise, neuromuscular re-education, manual therapy, therapeutic activity and moist heat    Objective   verbal consent obtained for internal pelvic exam/treatment with declined need for second person in room   Palpation:   Supine:   Abdominals, Iliacus, psoas - mild-mod B   Adductors - mild-mod B  Moderate tension of superficial pfm and levator ani B    See flowsheet for details of treatment following reassessment.     Assessment/Plan  Progress to physical therapy goals is fair.She reports improved pain in general but increased with more activities. She tolerated manual externally last week with out increased symptoms. Implemented manual internally today and will assess response as previously had significant flare in symptoms. She has met 3/3 short term goals and 0/4 long term goals. She will benefit from continued skilled physical therapy to address remaining impairments and functional limitations.   Progress toward previous goals: Partially Met    Goals:   STG's: 4 weeks  · Patient will report > 25% reduction in overall pain - met  · Patient will report > 25% improvement in urinary symptoms - met  · Patient will be able to perform HEP with minimal verbal cues - met     LTG's: By discharge  · Patient will report a decrease in  pain to < 2/10  · Patient will report >75% improvement in urinary symptoms   · Patient will report an elimination of urinary urgency   · Patient will be independent with HEP        Recommendations: Continue as planned  Timeframe: 2 months  Prognosis to achieve goals: good    PT Signature: Daniel Fernandez PT      Based upon review of the patient's progress and continued therapy plan, it is my medical opinion that Edwige Nichols should continue physical therapy treatment at [facility] PHYSICAL THERAPY  [address] [place] [id number] [phone].    Signature: __________________________________    PHYSICIAN: Kapil Reaves MD  NPI: [id number]                                        1315/1400  Manual Therapy:    38     mins  10582;  Therapeutic Exercise:    0     mins  87114;     Neuromuscular Dejuan:    0    mins  97596;    Therapeutic Activity:     5     mins  35261;     Gait Trainin     mins  61073;     Ultrasound:     0     mins  78954;    Electrical Stimulation:    0     mins  00574 ( );  Dry Needling     0     mins self-pay    Timed Treatment:   43   mins   Total Treatment:     43   mins

## 2022-05-17 ENCOUNTER — TREATMENT (OUTPATIENT)
Dept: PHYSICAL THERAPY | Facility: CLINIC | Age: 52
End: 2022-05-17

## 2022-05-17 DIAGNOSIS — N94.89 HIGH-TONE PELVIC FLOOR DYSFUNCTION: ICD-10-CM

## 2022-05-17 DIAGNOSIS — M62.89 PELVIC FLOOR DYSFUNCTION: ICD-10-CM

## 2022-05-17 DIAGNOSIS — R10.2 PELVIC PAIN: Primary | ICD-10-CM

## 2022-05-17 PROCEDURE — 97110 THERAPEUTIC EXERCISES: CPT | Performed by: PHYSICAL THERAPIST

## 2022-05-17 PROCEDURE — 97140 MANUAL THERAPY 1/> REGIONS: CPT | Performed by: PHYSICAL THERAPIST

## 2022-05-17 NOTE — PROGRESS NOTES
Physical Therapy Daily Progress Note  Patient: Edwige Nichols   : 1970  Diagnosis/ICD-10 Code:  Pelvic pain [R10.2]  Referring practitioner: Kapil Reaves MD  Date of Initial Visit: Type: THERAPY  Noted: 2021  Today's Date: 2022  Patient seen for 14 sessions      Subjective   Edwige Nichols reports doing decent. Has been doing a lot of work and has pushed her body.   Pain Rating (0-10): 3      Objective   verbal consent obtained for internal pelvic exam/treatment with declined need for second person in room     See Exercise, Manual, and Modality Logs for complete treatment.       Assessment/Plan  Pt has been tolerating internal without flare in symptoms. She has been able to do more work without significant increase in pain. Pt tolerated manual today with mild discomfort. She is compliant with stretching program and is to continue at this time. Will progress manual as tolerated to further decrease pelvic pain.     Progress per Plan of Care          1230/1315   Timed:         Manual Therapy:    35     mins  43241;     Therapeutic Exercise:    8     mins  02712;     Neuromuscular Dejuan:    0    mins  39981;    Therapeutic Activity:     0     mins  82773;     Gait Trainin     mins  28831;     Ultrasound:     0     mins  90646;    Ionto                               0    mins   49895  Self Care                       0     mins   89878  Canalith Repos               0    mins  04988    Un-Timed:  Electrical Stimulation:    0     mins  70523 ( );  Dry Needling     0     mins self-pay  Traction     0     mins 16168  Low Eval     0     Mins  13144  Mod Eval     0     Mins  36528  High Eval                       0     Mins  81293  Re-Eval                           0    mins  82865    Timed Treatment:   43   mins   Total Treatment:     43   mins    Daniel Fernandez PT  [place] License # [id number]  Physical Therapist

## 2022-05-24 ENCOUNTER — TREATMENT (OUTPATIENT)
Dept: PHYSICAL THERAPY | Facility: CLINIC | Age: 52
End: 2022-05-24

## 2022-05-24 DIAGNOSIS — M62.89 PELVIC FLOOR DYSFUNCTION: ICD-10-CM

## 2022-05-24 DIAGNOSIS — R10.2 PELVIC PAIN: Primary | ICD-10-CM

## 2022-05-24 DIAGNOSIS — N94.89 HIGH-TONE PELVIC FLOOR DYSFUNCTION: ICD-10-CM

## 2022-05-24 PROCEDURE — 97140 MANUAL THERAPY 1/> REGIONS: CPT | Performed by: PHYSICAL THERAPIST

## 2022-05-24 NOTE — PROGRESS NOTES
Physical Therapy Daily Progress Note  Patient: Edwige Nichols   : 1970  Diagnosis/ICD-10 Code:  Pelvic pain [R10.2]  Referring practitioner: Kapil Reaves MD  Date of Initial Visit: Type: THERAPY  Noted: 2021  Today's Date: 2022  Patient seen for 15 sessions      Subjective   Edwige Nichols reports overall better. Exercises help her to be able to do more with significant increase in pain. Not to the extent it was before. Increased stress with son.     Pain Rating (0-10): 2      Objective   verbal consent obtained for internal pelvic exam/treatment with declined need for second person in room     See Exercise, Manual, and Modality Logs for complete treatment.     Assessment/Plan  Pt with overall improving pain, decreased flares with increased activity. She tolerated manual with mild discomfort today. Will continue manual and progress as tolerated to decrease pelvic pain symptoms.     Progress per Plan of Care         1400/1445   Timed:         Manual Therapy:    38     mins  30964;     Therapeutic Exercise:    0     mins  51774;     Neuromuscular Dejuan:    0    mins  11910;    Therapeutic Activity:     5     mins  30624;     Gait Trainin     mins  09842;     Ultrasound:     0     mins  11222;    Ionto                               0    mins   78414  Self Care                       0     mins   37878  Canalith Repos               0    mins  86854    Un-Timed:  Electrical Stimulation:    0     mins  22814 ( );  Dry Needling     00     mins self-pay  Traction     0     mins 78242  Low Eval     0     Mins  19028  Mod Eval     0     Mins  16268  High Eval                       0     Mins  81019  Re-Eval                           0    mins  26323    Timed Treatment:   43   mins   Total Treatment:     43   mins    Daniel Fernandez PT  [place] License # [id number]  Physical Therapist

## 2022-06-15 ENCOUNTER — TREATMENT (OUTPATIENT)
Dept: PHYSICAL THERAPY | Facility: CLINIC | Age: 52
End: 2022-06-15

## 2022-06-15 DIAGNOSIS — N94.89 HIGH-TONE PELVIC FLOOR DYSFUNCTION: ICD-10-CM

## 2022-06-15 DIAGNOSIS — R10.2 PELVIC PAIN: Primary | ICD-10-CM

## 2022-06-15 PROCEDURE — 97140 MANUAL THERAPY 1/> REGIONS: CPT | Performed by: PHYSICAL THERAPIST

## 2022-06-15 NOTE — PROGRESS NOTES
Re-Assessment / Re-Certification        Patient: Edwige Nichols   : 1970  Diagnosis/ICD-10 Code:  Pelvic pain [R10.2]  Referring practitioner: Kapil Reaves MD  Date of Initial Visit: Type: THERAPY  Noted: 2021  Today's Date: 2022  Patient seen for 16 sessions      Subjective:   Edwige Nichols reports: had a new pain on R side in skin folds area and it felt harder. Massaged and did hot baths and that helped. Chondritis is bothering her as well as gut. Feels like a switch flips when working and knows has over done it. A little sharpness in vaginal area surge of discomfort. Rates improvement >50% for pain. Urinary symptoms still has frequency. Still drinking a lot due to Sjogren's. Can go an hour typically. At least 8 x 16 oz per day. Pain 3/10 over the last week.     Subjective Questionnaire: VQ 10  Clinical Progress: improved  Home Program Compliance: Yes  Treatment has included: therapeutic exercise, neuromuscular re-education, manual therapy, therapeutic activity and moist heat      Objective   verbal consent obtained for internal pelvic exam/treatment with declined need for second person in room     Mild tension abdominal, iliopsoas, superficial PFM and levator ani.   Mild connective tissue restrictions R vulvar tissue; labia majora  See flowsheet for details of treatment following reassessment.       Assessment/Plan  Progress to physical therapy goals is good.She reports improved pain and ability to work more. She has met 3/3 short term goals and 0/4 long term goals. She demonstrates decreasing tension of abdominal, hip and pelvic floor mm but demonstrates vulvar connective tissue restrictions today. Tolerated manual with mild discomfort. She will benefit from continued skilled physical therapy to address remaining impairments and functional limitations.   Progress toward previous goals: Partially Met    STG's: 4 weeks  · Patient will report > 25% reduction in overall pain -  met  · Patient will report > 25% improvement in urinary symptoms - met  · Patient will be able to perform HEP with minimal verbal cues - met     LTG's: By discharge  · Patient will report a decrease in pain to < 2/10  · Patient will report >75% improvement in urinary symptoms   · Patient will report an elimination of urinary urgency   · Patient will be independent with HEP        Recommendations: Continue as planned  Timeframe: 6 weeks  Prognosis to achieve goals: fair    PT Signature: Daniel Fernandez PT      Based upon review of the patient's progress and continued therapy plan, it is my medical opinion that Edwige Nichols should continue physical therapy treatment at [facility] PHYSICAL THERAPY  [address] [place] [address] [phone].    Signature: __________________________________    PHYSICIAN: Kapil Reaves MD  NPI: [id number]                                        1030/1115  Manual Therapy:    38     mins  82839;  Therapeutic Exercise:    0     mins  94421;     Neuromuscular Dejuan:    0    mins  60984;    Therapeutic Activity:     5     mins  80182;     Gait Trainin     mins  40192;     Ultrasound:     0     mins  91883;    Electrical Stimulation:    0     mins  48558 ([facility] );  Dry Needling     0     mins self-pay    Timed Treatment:   43   mins   Total Treatment:     43   mins

## 2022-06-29 ENCOUNTER — TREATMENT (OUTPATIENT)
Dept: PHYSICAL THERAPY | Facility: CLINIC | Age: 52
End: 2022-06-29

## 2022-06-29 DIAGNOSIS — M62.89 PELVIC FLOOR DYSFUNCTION: ICD-10-CM

## 2022-06-29 DIAGNOSIS — N94.89 HIGH-TONE PELVIC FLOOR DYSFUNCTION: ICD-10-CM

## 2022-06-29 DIAGNOSIS — R10.2 PELVIC PAIN: Primary | ICD-10-CM

## 2022-06-29 PROCEDURE — 97140 MANUAL THERAPY 1/> REGIONS: CPT | Performed by: PHYSICAL THERAPIST

## 2022-07-15 ENCOUNTER — TREATMENT (OUTPATIENT)
Dept: PHYSICAL THERAPY | Facility: CLINIC | Age: 52
End: 2022-07-15

## 2022-07-15 DIAGNOSIS — M62.89 PELVIC FLOOR DYSFUNCTION: ICD-10-CM

## 2022-07-15 DIAGNOSIS — R10.2 PELVIC PAIN: Primary | ICD-10-CM

## 2022-07-15 DIAGNOSIS — N94.89 HIGH-TONE PELVIC FLOOR DYSFUNCTION: ICD-10-CM

## 2022-07-15 PROCEDURE — 97116 GAIT TRAINING THERAPY: CPT | Performed by: PHYSICAL THERAPIST

## 2022-07-15 PROCEDURE — 97110 THERAPEUTIC EXERCISES: CPT | Performed by: PHYSICAL THERAPIST

## 2022-07-18 NOTE — PROGRESS NOTES
Physical Therapy Daily Treatment Note  Patient: Edwige Nichols   : 1970  Diagnosis/ICD-10 Code:  Pelvic pain [R10.2]  Referring practitioner: Kapil Reaves MD  Date of Initial Visit: Type: THERAPY  Noted: 2021  Today's Date: 2022  Patient seen for 18 sessions      Subjective   Edwige Nichols reports doing pretty well. 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 She is wondering if the way she is moving is causing issues with her pain.   Pain Rating (0-10): 2      Objective   Gait: Increased lateral shift, decreased arm swing, decreased step length    See Exercise, Manual, and Modality Logs for complete treatment.       Assessment/Plan  Pt did well with gait training today to improve function. Increased focus on step length and neutral pelvic position. Attempted glute strengthening with steps but increased knee pain. Pt HEP progressed to include gait training on treadmill and bridging. Will assess response. Pt educated on pelvic wand for self release.     Progress per Plan of Care         0945/1030   Timed:         Manual Therapy:    0     mins  31594;     Therapeutic Exercise:    15     mins  05185;     Neuromuscular Dejuan:    0    mins  95490;    Therapeutic Activity:     5     mins  01283;     Gait Trainin     mins  37546;     Ultrasound:     0     mins  91771;    Ionto                               0    mins   24618  Self Care                       0     mins   44902  Canalith Repos               0    mins  74984    Un-Timed:  Electrical Stimulation:    0     mins  55731 ( );  Dry Needling     0     mins self-pay  Traction     0     mins 06965  Low Eval     0     Mins  77239  Mod Eval     0     Mins  34432  High Eval                       0     Mins  76558  Re-Eval                           0    mins  58274    Timed Treatment:   45   mins   Total Treatment:     45   mins    Daniel Fernandez PT  [place] License # [id number]  Physical Therapist

## 2022-08-15 ENCOUNTER — TELEPHONE (OUTPATIENT)
Dept: FAMILY MEDICINE CLINIC | Facility: CLINIC | Age: 52
End: 2022-08-15

## 2022-08-15 NOTE — TELEPHONE ENCOUNTER
Caller: Edwige Nichols    Relationship to patient: Self    Best call back number: [phone]    Date of positive COVID19 test: 8/15/22 - [facility]    COVID19 symptoms:  HAS COSTIOCHONDRITIS. INFLAMMATION OF RIBS BEEN BOTHERING HER.  MORE PAIN ACHING IN LEGS, KNEES.  TEMP 100, SLIGHT HEADACHE, SORE THROAT   AND SON HAVE NO SYMPTOMS    Additional information or concerns: NOT SURE WHAT TO DO.   WANTS TO KNOW IF HE SHOULD TAKE OFF WORK.      What is the patients preferred pharmacy:   SAMRA BERMUDEZ [address] [place] [address] [facility] [phone] [facility] [phone]

## 2022-08-15 NOTE — TELEPHONE ENCOUNTER
Quarantine 5 days and tehn mask up the next 5 days when around anyone.  Ibuprofen for body aches  Fluids  cepacol for ST    If  vaccinated no need to quarantine unless symptoms develop.  Should wear mask around wife and others for the next 5 days and test in 5 days even if no symptoms.  If negative at that time can remove mask

## 2022-10-19 ENCOUNTER — OFFICE VISIT (OUTPATIENT)
Dept: UROLOGY | Facility: CLINIC | Age: 52
End: 2022-10-19

## 2022-10-19 VITALS
TEMPERATURE: 97.2 F | RESPIRATION RATE: 12 BRPM | WEIGHT: 211.8 LBS | HEART RATE: 55 BPM | HEIGHT: 66 IN | OXYGEN SATURATION: 99 % | BODY MASS INDEX: 34.04 KG/M2 | SYSTOLIC BLOOD PRESSURE: 142 MMHG | DIASTOLIC BLOOD PRESSURE: 90 MMHG

## 2022-10-19 DIAGNOSIS — N30.10 INTERSTITIAL CYSTITIS: Primary | ICD-10-CM

## 2022-10-19 LAB
BILIRUB BLD-MCNC: NEGATIVE MG/DL
CLARITY, POC: CLEAR
COLOR UR: NORMAL
EXPIRATION DATE: NORMAL
GLUCOSE UR STRIP-MCNC: NEGATIVE MG/DL
KETONES UR QL: NEGATIVE
LEUKOCYTE EST, POC: NEGATIVE
Lab: NORMAL
NITRITE UR-MCNC: NEGATIVE MG/ML
PH UR: 6 [PH] (ref 5–8)
PROT UR STRIP-MCNC: NEGATIVE MG/DL
RBC # UR STRIP: NEGATIVE /UL
SP GR UR: 1.02 (ref 1–1.03)
UROBILINOGEN UR QL: NORMAL

## 2022-10-19 PROCEDURE — 81003 URINALYSIS AUTO W/O SCOPE: CPT | Performed by: PHYSICIAN ASSISTANT

## 2022-10-19 PROCEDURE — 99213 OFFICE O/P EST LOW 20 MIN: CPT | Performed by: PHYSICIAN ASSISTANT

## 2022-10-19 NOTE — PROGRESS NOTES
Chief Complaint   Patient presents with   • interstitial cystitis   • Follow-up        HPI  Ms. Nichols is a 52 y.o. female with history of IC who presents for follow up.     At this visit, notes that PFPT has continued to help quite a bit. Her known IC trigger is sugar and she avoids this.     Past Medical History:   Diagnosis Date   • B12 deficiency 04/02/2021   • Costochondritis    • Depression    • Fibromyalgia    • IBS (irritable bowel syndrome)    • IC (interstitial cystitis)    • Muscle disorder    • Muscle pain    • Pelvic floor dysfunction 04/26/2021   • Persistent postural-perceptual dizziness    • Sacro-iliac pain     joint dysfunction   • Screening breast examination     Slef; Admits   • Sjogren's disease (HCC)    • Urethral syndrome    • Vitamin D deficiency 04/02/2021   • Vocal cord dysfunction        Past Surgical History:   Procedure Laterality Date   • BREAST BIOPSY Left    • COLONOSCOPY     • LAPAROSCOPIC CHOLECYSTECTOMY     • REDUCTION MAMMAPLASTY Bilateral 2014   • WISDOM TOOTH EXTRACTION           Current Outpatient Medications:   •  Cholecalciferol 125 MCG (5000 UT) tablet, Daily., Disp: , Rfl:   •  lansoprazole (PREVACID) 15 MG capsule, Take 15 mg by mouth As Needed., Disp: , Rfl:   •  Calcium Glycerophosphate (PRELIEF PO), Take  by mouth., Disp: , Rfl:   •  Carboxymethylcellulose Sodium (EYE DROPS OP), Apply  to eye(s) as directed by provider., Disp: , Rfl:   •  cetirizine (zyrTEC) 10 MG tablet, Take 10 mg by mouth Daily., Disp: , Rfl:   •  fluticasone (FLONASE) 50 MCG/ACT nasal spray, 2 sprays into the nostril(s) as directed by provider Daily., Disp: , Rfl:   •  ondansetron ODT (Zofran ODT) 8 MG disintegrating tablet, Place 1 tablet on the tongue Every 8 (Eight) Hours As Needed for Nausea., Disp: 20 tablet, Rfl: 1  •  Triamcinolone Acetonide (NASACORT) 55 MCG/ACT nasal inhaler, Daily., Disp: , Rfl:   •  vitamin D (ERGOCALCIFEROL) 1.25 MG (94788 UT) capsule capsule, Take 50,000 Units by mouth 1  "(One) Time Per Week., Disp: , Rfl:      Physical Exam  Visit Vitals  /90 (BP Location: Right arm, Patient Position: Sitting, Cuff Size: Adult)   Pulse 55   Temp 97.2 °F (36.2 °C) (Temporal)   Resp 12   Ht 167.6 cm (65.98\")   Wt 96.1 kg (211 lb 12.8 oz)   SpO2 99%   BMI 34.21 kg/m²       Labs  Brief Urine Lab Results  (Last result in the past 365 days)      Color   Clarity   Blood   Leuk Est   Nitrite   Protein   CREAT   Urine HCG        10/19/22 1004 Straw   Clear   Negative   Negative   Negative   Negative                 Lab Results   Component Value Date    GLUCOSE 117 (H) 11/24/2021    CALCIUM 9.2 11/24/2021     (L) 11/24/2021    K 3.7 11/24/2021    CO2 21.0 (L) 11/24/2021     11/24/2021    BUN 13 11/24/2021    CREATININE 0.98 11/24/2021    EGFRIFAFRI 79 01/03/2020    EGFRIFNONA 60 (L) 11/24/2021    BCR 13.3 11/24/2021    ANIONGAP 14.0 11/24/2021       Lab Results   Component Value Date    WBC 10.07 11/23/2021    HGB 12.7 11/23/2021    HCT 39.5 11/23/2021    MCV 83.0 11/23/2021     11/23/2021       Urine Culture    Urine Culture 11/16/21   Urine Culture Final report              Assessment  52 y.o. female with hx of IC and PFD who presents for follow up. IC symptoms have greatly improved and no flares since starting PFPT. No longer on any oral meds. Avoids sugar. UA today benign.     Plan  1. FU as desired        "

## 2022-10-20 DIAGNOSIS — M62.89 PELVIC FLOOR DYSFUNCTION: Primary | ICD-10-CM

## 2022-12-13 ENCOUNTER — TREATMENT (OUTPATIENT)
Dept: PHYSICAL THERAPY | Facility: CLINIC | Age: 52
End: 2022-12-13

## 2022-12-13 DIAGNOSIS — R10.2 PELVIC PAIN: Primary | ICD-10-CM

## 2022-12-13 DIAGNOSIS — N94.89 HIGH-TONE PELVIC FLOOR DYSFUNCTION: ICD-10-CM

## 2022-12-13 DIAGNOSIS — M62.89 PELVIC FLOOR DYSFUNCTION: ICD-10-CM

## 2022-12-13 PROCEDURE — 97530 THERAPEUTIC ACTIVITIES: CPT | Performed by: PHYSICAL THERAPIST

## 2022-12-13 PROCEDURE — 97110 THERAPEUTIC EXERCISES: CPT | Performed by: PHYSICAL THERAPIST

## 2022-12-13 NOTE — PROGRESS NOTES
Discharge Summary  PHYSICAL THERAPY    [address] [facility], [address]; [place] [address]           Patient: Edwige Nichols   : 1970  Diagnosis/ICD-10 Code:  Pelvic pain [R10.2]  Referring practitioner: Kapil Reaves MD  Date of Initial Visit: Type: THERAPY  Noted: 2021  Today's Date: 12/15/2022  Patient seen for 19 sessions      Subjective:   Edwige Nichols reports: doing really good. Saw PA at urologist and has released her on her end. Feels has been doing really well and maintaining well. In some ways feels like a new person. Knows if doesn't do HEP she can go backwards and it is a matter of managing her symptoms. Looking at exercises to help with her chondritis and it has been the best it has ever been. States she can do more and when she has pain she can stop and do exercises and that helps things a lot.      Subjective Questionnaire: VQ: 4  Clinical Progress: improved  Home Program Compliance: Yes  Treatment has included: therapeutic exercise, neuromuscular re-education, manual therapy, therapeutic activity and moist heat      Objective   Pt declined physical exam  See flowsheet for details of treatment following reassessment.     Assessment/Plan  Progress to physical therapy goals is good.She reports improved pain and bladder symptoms allowing for improved function. She has met 3/3 short term goals and 4/4 long term goals. She is appropriate for discharge at this time and has been instructed in maintenance HEP.   Progress toward previous goals: All Met    Goals:   STG's: 4 weeks  • Patient will report > 25% reduction in overall pain - met  • Patient will report > 25% improvement in urinary symptoms - met  • Patient will be able to perform HEP with minimal verbal cues - met     LTG's: By discharge  • Patient will report a decrease in pain to < 2/10 - met  • Patient will report >75% improvement in urinary symptoms -met  • Patient will report an elimination of urinary urgency -  met  • Patient will be independent with HEP - met         Recommendations: Discharge      PT Signature: Daniel Fernandez PT      Based upon review of the patient's progress and continued therapy plan, it is my medical opinion that Edwige Nichols should discharge from physical therapy treatment at [facility] PHYSICAL THERAPY  [address] [place] [id number] [phone].    Signature: __________________________________  Kapil Reaves MD      Manual Therapy:    0     mins  76784;  Therapeutic Exercise:    10     mins  75965;     Neuromuscular Dejuan:    0    mins  13094;    Therapeutic Activity:     20     mins  91007;     Gait Trainin     mins  01229;     Ultrasound:     0     mins  27908;    Electrical Stimulation:    0     mins  16989 ( );  Dry Needling     0     mins self-pay    Timed Treatment:   30   mins   Total Treatment:     30   mins

## 2023-02-22 ENCOUNTER — OFFICE VISIT (OUTPATIENT)
Dept: FAMILY MEDICINE CLINIC | Facility: CLINIC | Age: 53
End: 2023-02-22
Payer: COMMERCIAL

## 2023-02-22 VITALS
RESPIRATION RATE: 18 BRPM | DIASTOLIC BLOOD PRESSURE: 90 MMHG | BODY MASS INDEX: 34.55 KG/M2 | SYSTOLIC BLOOD PRESSURE: 138 MMHG | HEART RATE: 64 BPM | HEIGHT: 66 IN | WEIGHT: 215 LBS | TEMPERATURE: 97.7 F

## 2023-02-22 DIAGNOSIS — E53.8 B12 DEFICIENCY: ICD-10-CM

## 2023-02-22 DIAGNOSIS — M35.00 SJOGREN'S SYNDROME, WITH UNSPECIFIED ORGAN INVOLVEMENT: ICD-10-CM

## 2023-02-22 DIAGNOSIS — Z12.11 SCREEN FOR COLON CANCER: ICD-10-CM

## 2023-02-22 DIAGNOSIS — Z12.31 SCREENING MAMMOGRAM FOR BREAST CANCER: ICD-10-CM

## 2023-02-22 DIAGNOSIS — Z00.00 WELL WOMAN EXAM (NO GYNECOLOGICAL EXAM): Primary | ICD-10-CM

## 2023-02-22 DIAGNOSIS — E55.9 VITAMIN D DEFICIENCY: ICD-10-CM

## 2023-02-22 DIAGNOSIS — Z11.59 ENCOUNTER FOR HEPATITIS C SCREENING TEST FOR LOW RISK PATIENT: ICD-10-CM

## 2023-02-22 DIAGNOSIS — R30.0 DYSURIA: ICD-10-CM

## 2023-02-22 DIAGNOSIS — R53.82 CHRONIC FATIGUE: ICD-10-CM

## 2023-02-22 DIAGNOSIS — Z83.42 FAMILY HISTORY OF HYPERCHOLESTEROLEMIA: ICD-10-CM

## 2023-02-22 PROCEDURE — 99396 PREV VISIT EST AGE 40-64: CPT | Performed by: FAMILY MEDICINE

## 2023-02-22 NOTE — PROGRESS NOTES
"Subjective     Chief Complaint   Patient presents with   • Annual Exam       Edwige Nichols is a 52 y.o. female who presents for an annual exam.. The patient is sexually active. GYN screening history: last pap: approximate date 3/2022 and was normal. The patient wears seatbelts: yes. 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  The patient reports that there is not domestic violence in her life.     No longer seeing the rheumatologist for her sjogrens as she and rheumatologist did not see eye to eye.      She has pelvic floor dysfunction and was seeing PT and this did help  In the past she had colonoscopy and felt miserable after this.  Last scope was 2017  She is hoping she does not need a scope and could just do cologuard test      History of abnormal Pap smear: no  Family history of uterine or ovarian cancer: no  Family history of colon cancer: no  History of abnormal mammogram: no  Family history of breast cancer: no  Colonoscopy history:         Menstrual History:  OB History        1    Para   1    Term   1            AB        Living           SAB        IAB        Ectopic        Molar        Multiple        Live Births                     No LMP recorded. Patient is perimenopausal.         The following portions of the patient's history were reviewed and updated as appropriate:vital signs, allergies, current medications, past family history, past medical history, past social history, past surgical history and problem list    Review of Systems   Pertinent items are noted in HPI.     Objective     /90   Pulse 64   Temp 97.7 °F (36.5 °C)   Resp 18   Ht 167.6 cm (66\")   Wt 97.5 kg (215 lb)   BMI 34.70 kg/m²       Physical Exam  Vitals and nursing note reviewed.   Constitutional:       General: She is not in acute distress.     Appearance: Normal appearance. She is well-developed.   HENT:      Head: Normocephalic and " atraumatic.      Right Ear: Tympanic membrane, ear canal and external ear normal.      Left Ear: Tympanic membrane, ear canal and external ear normal.      Nose: Nose normal.   Eyes:      Extraocular Movements: Extraocular movements intact.      Conjunctiva/sclera: Conjunctivae normal.   Neck:      Thyroid: No thyromegaly.   Cardiovascular:      Rate and Rhythm: Normal rate and regular rhythm.      Heart sounds: Normal heart sounds. No murmur heard.  Pulmonary:      Effort: Pulmonary effort is normal. No respiratory distress.      Breath sounds: Normal breath sounds.   Abdominal:      General: Bowel sounds are normal. There is no distension.      Palpations: Abdomen is soft.      Tenderness: There is no abdominal tenderness.   Musculoskeletal:      Cervical back: Normal range of motion and neck supple.   Lymphadenopathy:      Cervical: No cervical adenopathy.   Skin:     General: Skin is warm and dry.   Neurological:      Mental Status: She is alert and oriented to person, place, and time.   Psychiatric:         Mood and Affect: Mood normal.         Behavior: Behavior normal.         Thought Content: Thought content normal.         Judgment: Judgment normal.             Assessment & Plan     ASSESSMENT  Healthy female exam.    1. Well woman exam (no gynecological exam)    2. Sjogren's syndrome, with unspecified organ involvement (HCC)    3. Vitamin D deficiency    4. B12 deficiency    5. Dysuria    6. Screening mammogram for breast cancer    7. Screen for colon cancer    8. Encounter for hepatitis C screening test for low risk patient    9. Family history of hypercholesterolemia    10. Chronic fatigue         PLAN  1.   Orders Placed This Encounter   Procedures   • Urine Culture - , Urine, Clean Catch   • Mammo Screening Digital Tomosynthesis Bilateral With CAD   • Comprehensive Metabolic Panel   • Vitamin D,25-Hydroxy   • TSH   • Lipid Panel   • Hepatitis C Antibody   • Cologuard - Stool, Per Rectum   • HIGINIO With /  DsDNA, RNP, Sjogrens A / B, White   • Sedimentation Rate   • Rheumatoid Factor   • Vitamin B12 & Folate   • Magnesium   • CBC & Differential       2. Medications prescribed this encounter:    No orders of the defined types were placed in this encounter.      3. Will f/u pending labs, dry eyes and mouth with sojgrens are stable and would not want to return to [facility] in future.  counseled pt about well woman care including diet and exercise.  Will check mammogram and colon cancer and Hep C.    4. May need alternative rheumatologist      Sebastian Green MD  2/22/2023

## 2023-02-24 LAB
25(OH)D3+25(OH)D2 SERPL-MCNC: 85.4 NG/ML (ref 30–100)
ALBUMIN SERPL-MCNC: 5.1 G/DL (ref 3.8–4.9)
ALBUMIN/GLOB SERPL: 2.1 {RATIO} (ref 1.2–2.2)
ALP SERPL-CCNC: 73 IU/L (ref 44–121)
ALT SERPL-CCNC: 16 IU/L (ref 0–32)
ANA SER QL: POSITIVE
AST SERPL-CCNC: 20 IU/L (ref 0–40)
BACTERIA UR CULT: NORMAL
BACTERIA UR CULT: NORMAL
BASOPHILS # BLD AUTO: 0 X10E3/UL (ref 0–0.2)
BASOPHILS NFR BLD AUTO: 1 %
BILIRUB SERPL-MCNC: 0.4 MG/DL (ref 0–1.2)
BUN SERPL-MCNC: 18 MG/DL (ref 6–24)
BUN/CREAT SERPL: 21 (ref 9–23)
CALCIUM SERPL-MCNC: 10.1 MG/DL (ref 8.7–10.2)
CENTROMERE B AB SER-ACNC: <0.2 AI (ref 0–0.9)
CHLORIDE SERPL-SCNC: 103 MMOL/L (ref 96–106)
CHOLEST SERPL-MCNC: 136 MG/DL (ref 100–199)
CHROMATIN AB SERPL-ACNC: <0.2 AI (ref 0–0.9)
CO2 SERPL-SCNC: 25 MMOL/L (ref 20–29)
CREAT SERPL-MCNC: 0.87 MG/DL (ref 0.57–1)
DSDNA AB SER-ACNC: <1 IU/ML (ref 0–9)
EGFRCR SERPLBLD CKD-EPI 2021: 81 ML/MIN/1.73
ENA JO1 AB SER-ACNC: <0.2 AI (ref 0–0.9)
ENA RNP AB SER-ACNC: <0.2 AI (ref 0–0.9)
ENA SCL70 AB SER-ACNC: <0.2 AI (ref 0–0.9)
ENA SM AB SER-ACNC: <0.2 AI (ref 0–0.9)
ENA SS-A AB SER-ACNC: <0.2 AI (ref 0–0.9)
ENA SS-B AB SER-ACNC: 1.7 AI (ref 0–0.9)
EOSINOPHIL # BLD AUTO: 0.1 X10E3/UL (ref 0–0.4)
EOSINOPHIL NFR BLD AUTO: 1 %
ERYTHROCYTE [DISTWIDTH] IN BLOOD BY AUTOMATED COUNT: 13.3 % (ref 11.7–15.4)
ERYTHROCYTE [SEDIMENTATION RATE] IN BLOOD BY WESTERGREN METHOD: 7 MM/HR (ref 0–40)
FOLATE SERPL-MCNC: >20 NG/ML
GLOBULIN SER CALC-MCNC: 2.4 G/DL (ref 1.5–4.5)
GLUCOSE SERPL-MCNC: 103 MG/DL (ref 70–99)
HCT VFR BLD AUTO: 41 % (ref 34–46.6)
HCV IGG SERPL QL IA: NON REACTIVE
HDLC SERPL-MCNC: 49 MG/DL
HGB BLD-MCNC: 13.1 G/DL (ref 11.1–15.9)
IMM GRANULOCYTES # BLD AUTO: 0 X10E3/UL (ref 0–0.1)
IMM GRANULOCYTES NFR BLD AUTO: 0 %
LDLC SERPL CALC-MCNC: 72 MG/DL (ref 0–99)
LYMPHOCYTES # BLD AUTO: 1.9 X10E3/UL (ref 0.7–3.1)
LYMPHOCYTES NFR BLD AUTO: 34 %
Lab: ABNORMAL
MAGNESIUM SERPL-MCNC: 2.2 MG/DL (ref 1.6–2.3)
MCH RBC QN AUTO: 26.5 PG (ref 26.6–33)
MCHC RBC AUTO-ENTMCNC: 32 G/DL (ref 31.5–35.7)
MCV RBC AUTO: 83 FL (ref 79–97)
MONOCYTES # BLD AUTO: 0.3 X10E3/UL (ref 0.1–0.9)
MONOCYTES NFR BLD AUTO: 5 %
NEUTROPHILS # BLD AUTO: 3.2 X10E3/UL (ref 1.4–7)
NEUTROPHILS NFR BLD AUTO: 59 %
PLATELET # BLD AUTO: 250 X10E3/UL (ref 150–450)
POTASSIUM SERPL-SCNC: 4.3 MMOL/L (ref 3.5–5.2)
PROT SERPL-MCNC: 7.5 G/DL (ref 6–8.5)
RBC # BLD AUTO: 4.94 X10E6/UL (ref 3.77–5.28)
RHEUMATOID FACT SERPL-ACNC: <10 IU/ML
SODIUM SERPL-SCNC: 142 MMOL/L (ref 134–144)
TRIGL SERPL-MCNC: 74 MG/DL (ref 0–149)
TSH SERPL DL<=0.005 MIU/L-ACNC: 1.94 UIU/ML (ref 0.45–4.5)
VIT B12 SERPL-MCNC: 412 PG/ML (ref 232–1245)
VLDLC SERPL CALC-MCNC: 15 MG/DL (ref 5–40)
WBC # BLD AUTO: 5.5 X10E3/UL (ref 3.4–10.8)

## 2023-03-14 ENCOUNTER — TRANSCRIBE ORDERS (OUTPATIENT)
Dept: ADMINISTRATIVE | Facility: HOSPITAL | Age: 53
End: 2023-03-14
Payer: COMMERCIAL

## 2023-03-14 DIAGNOSIS — Z12.31 VISIT FOR SCREENING MAMMOGRAM: Primary | ICD-10-CM

## 2023-03-23 DIAGNOSIS — Z12.11 SCREEN FOR COLON CANCER: Primary | ICD-10-CM

## 2023-04-24 ENCOUNTER — HOSPITAL ENCOUNTER (OUTPATIENT)
Dept: MAMMOGRAPHY | Facility: HOSPITAL | Age: 53
Discharge: HOME | End: 2023-04-24
Admitting: FAMILY MEDICINE
Payer: COMMERCIAL

## 2023-04-24 DIAGNOSIS — Z12.31 VISIT FOR SCREENING MAMMOGRAM: ICD-10-CM

## 2023-04-24 PROCEDURE — 77063 BREAST TOMOSYNTHESIS BI: CPT

## 2023-04-24 PROCEDURE — 77067 SCR MAMMO BI INCL CAD: CPT

## 2024-02-05 ENCOUNTER — TRANSCRIBE ORDERS (OUTPATIENT)
Dept: ADMINISTRATIVE | Facility: HOSPITAL | Age: 54
End: 2024-02-05
Payer: COMMERCIAL

## 2024-02-05 DIAGNOSIS — Z12.31 VISIT FOR SCREENING MAMMOGRAM: Primary | ICD-10-CM

## 2024-02-28 ENCOUNTER — OFFICE VISIT (OUTPATIENT)
Dept: OBSTETRICS AND GYNECOLOGY | Facility: CLINIC | Age: 54
End: 2024-02-28
Payer: COMMERCIAL

## 2024-02-28 VITALS
RESPIRATION RATE: 18 BRPM | BODY MASS INDEX: 36 KG/M2 | WEIGHT: 224 LBS | HEIGHT: 66 IN | SYSTOLIC BLOOD PRESSURE: 136 MMHG | DIASTOLIC BLOOD PRESSURE: 88 MMHG

## 2024-02-28 DIAGNOSIS — Z12.31 BREAST CANCER SCREENING BY MAMMOGRAM: ICD-10-CM

## 2024-02-28 DIAGNOSIS — Z01.419 PAP TEST, AS PART OF ROUTINE GYNECOLOGICAL EXAMINATION: Primary | ICD-10-CM

## 2024-02-28 DIAGNOSIS — Z01.419 WOMEN'S ANNUAL ROUTINE GYNECOLOGICAL EXAMINATION: ICD-10-CM

## 2024-02-28 DIAGNOSIS — M62.89 PELVIC FLOOR DYSFUNCTION: ICD-10-CM

## 2024-02-28 RX ORDER — MULTIPLE VITAMINS W/ MINERALS TAB 9MG-400MCG
1 TAB ORAL DAILY
COMMUNITY

## 2024-02-28 NOTE — PROGRESS NOTES
Gynecologic Annual Exam Note        GYN Annual Exam     CC - Here for annual exam.        HPI  Edwige Nichols is a 53 y.o. female, , who presents for annual well woman exam as a established patient of practice who is new to me.  She is postmenopausal.. Denies vaginal bleeding.   There were no changes to her medical or surgical history since her last visit. Marital Status: .  She is sexually active. She has not had new partners.. STD testing recommendations have been explained to the patient and she declines STD testing.    Hx pelvic floor dysfunction.  Has been seeing PT with great improvement.  She does exercises daily which helps to maintain improvement.  She has the PT number and plans to call prn problems.    The patient would like to discuss the following complaints today: none    Additional OB/GYN History   On HRT? No    Last Pap : 2022. Results: negative. HPV: not done. Her last HPV testing was ..   Last Completed Pap Smear            Ordered - PAP SMEAR (Every 3 Years) Ordered on 2022  Pap IG, Rfx HPV ASCU    10/20/2020  Pap IG, HPV-hr                  History of abnormal Pap smear: no  Family history of uterine, colon, breast, or ovarian cancer: yes - MGM had breast cancer  Performs monthly Self-Breast Exam: yes  Last mammogram: 2023. Done at [facility].    Last Completed Mammogram            Scheduled - MAMMOGRAM (Every 2 Years) Scheduled for 2023  Mammo Screening Digital Tomosynthesis Bilateral With CAD    2022  Mammo Screening Digital Tomosynthesis Bilateral With CAD    2021  Mammo Screening Digital Tomosynthesis Bilateral With CAD    2020  Mammo Screening Digital Tomosynthesis Bilateral With CAD    2019  Mammo Screening Digital Tomosynthesis Bilateral With CAD    Only the first 5 history entries have been loaded, but more history exists.                  Last colonoscopy: has had a  colonoscopy 7 years ago    Last Completed Colonoscopy            COLORECTAL CANCER SCREENING (COLONOSCOPY - Every 10 Years) Next due on 2023  SCANNED - COLONOSCOPY    2023  SCANNED - COLONOSCOPY    2023  SCANNED - COLONOSCOPY    2023  Cologuard component of Cologuard - Stool, Per Rectum    2017  SCANNED - COLONOSCOPY    Only the first 5 history entries have been loaded, but more history exists.                    She has never had a bone density scan  Exercises Regularly: no  Feelings of Anxiety or Depression: no      Tobacco Usage?: No       Current Outpatient Medications:     cetirizine (zyrTEC) 10 MG tablet, Take 1 tablet by mouth Daily., Disp: , Rfl:     lansoprazole (PREVACID) 15 MG capsule, Take 1 capsule by mouth As Needed., Disp: , Rfl:     multivitamin with minerals tablet tablet, Take 1 tablet by mouth Daily., Disp: , Rfl:     Triamcinolone Acetonide (NASACORT) 55 MCG/ACT nasal inhaler, Daily., Disp: , Rfl:     vitamin D (ERGOCALCIFEROL) 1.25 MG (86842 UT) capsule capsule, Take 1 capsule by mouth 1 (One) Time Per Week., Disp: , Rfl:     Patient denies the need for medication refills today.    OB History          1    Para   1    Term   1            AB        Living             SAB        IAB        Ectopic        Molar        Multiple        Live Births                    Past Medical History:   Diagnosis Date    Anxiety     B12 deficiency 2021    Costochondritis     Depression     Fibromyalgia     IBS (irritable bowel syndrome)     IC (interstitial cystitis)     Muscle disorder     Muscle pain     Pelvic floor dysfunction 2021    Persistent postural-perceptual dizziness     Sacro-iliac pain     joint dysfunction    Screening breast examination     Slef; Admits    Sjogren's disease     Urethral syndrome     Vitamin D deficiency 2021    Vocal cord dysfunction         Past Surgical History:   Procedure Laterality Date    BREAST  "BIOPSY Left     COLONOSCOPY      LAPAROSCOPIC CHOLECYSTECTOMY      REDUCTION MAMMAPLASTY Bilateral 2014    WISDOM TOOTH EXTRACTION         Health Maintenance   Topic Date Due    TDAP/TD VACCINES (1 - Tdap) Never done    ZOSTER VACCINE (1 of 2) Never done    Annual Gynecologic Pelvic and Breast Exam  03/12/2023    INFLUENZA VACCINE  Never done    COVID-19 Vaccine (3 - 2023-24 season) 09/01/2023    ANNUAL PHYSICAL  02/22/2024    BMI FOLLOWUP  02/22/2024    PAP SMEAR  03/18/2025    MAMMOGRAM  04/24/2025    COLORECTAL CANCER SCREENING  05/23/2033    HEPATITIS C SCREENING  Completed    Pneumococcal Vaccine 0-64  Aged Out       The additional following portions of the patient's history were reviewed and updated as appropriate: allergies, current medications, past family history, past medical history, past social history, past surgical history, and problem list.    Review of Systems    I have reviewed and agree with the HPI, ROS, and historical information as entered above. Kacie Schwartz, APRN      Objective   /88   Resp 18   Ht 167.6 cm (65.98\")   Wt 102 kg (224 lb)   LMP  (LMP Unknown) Comment: denies pregnancy  BMI 36.17 kg/m²     Physical Exam  Vitals and nursing note reviewed. Exam conducted with a chaperone present.   Constitutional:       General: She is not in acute distress.     Appearance: Normal appearance. She is well-developed. She is obese. She is not ill-appearing.   Neck:      Thyroid: No thyroid mass or thyromegaly.   Pulmonary:      Effort: Pulmonary effort is normal. No respiratory distress or retractions.   Chest:      Chest wall: No mass.   Breasts:     Right: Normal. No mass, nipple discharge, skin change or tenderness.      Left: Normal. No mass, nipple discharge, skin change or tenderness.   Abdominal:      General: There is no distension.      Palpations: Abdomen is soft. Abdomen is not rigid. There is no mass.      Tenderness: There is no abdominal tenderness. There is no guarding or " rebound.      Hernia: No hernia is present. There is no hernia in the left inguinal area.   Genitourinary:     General: Normal vulva.      Labia:         Right: No rash, tenderness or lesion.         Left: No rash, tenderness or lesion.       Vagina: Normal. No vaginal discharge or lesions.      Cervix: Normal.      Uterus: Normal. Not enlarged, not fixed and not tender.       Adnexa: Right adnexa normal and left adnexa normal.        Right: No mass or tenderness.          Left: No mass or tenderness.        Rectum: No external hemorrhoid.   Musculoskeletal:      Cervical back: No muscular tenderness.   Skin:     General: Skin is warm and dry.   Neurological:      Mental Status: She is alert and oriented to person, place, and time.   Psychiatric:         Mood and Affect: Mood normal.         Behavior: Behavior normal.            Assessment and Plan    Problem List Items Addressed This Visit          Genitourinary and Reproductive     Pelvic floor dysfunction     Other Visit Diagnoses       Pap test, as part of routine gynecological examination    -  Primary    Relevant Orders    LIQUID-BASED PAP SMEAR WITH HPV GENOTYPING IF ASCUS ([facility])    Women's annual routine gynecological examination        Breast cancer screening by mammogram        Relevant Orders    Mammo Screening Digital Tomosynthesis Bilateral With CAD            GYN annual well woman exam.   Reviewed monthly self breast exams.  Instructed to call with lumps, pain, or breast discharge.  Yearly mammograms ordered.  Ordered mammogram today.  Recommended use of Vitamin D and getting adequate calcium in her diet. (1500mg)  Osteoporosis screening ordered today.  Reviewed exercise as a preventative health measures.   Reviewed BMI and weight loss as preventative health measures.   Colonoscopy recommended.  Recommended Flu Vaccine in Fall of each year.  RTC in 1 year or PRN with problems.  Return in about 1 year (around 2/28/2025) for Annual physical.          Kacie Schwartz, APRN  02/28/2024

## 2024-03-05 LAB — REF LAB TEST METHOD: NORMAL

## 2024-04-25 ENCOUNTER — HOSPITAL ENCOUNTER (OUTPATIENT)
Dept: MAMMOGRAPHY | Facility: HOSPITAL | Age: 54
Discharge: HOME | End: 2024-04-25
Admitting: NURSE PRACTITIONER
Payer: COMMERCIAL

## 2024-04-25 DIAGNOSIS — Z12.31 VISIT FOR SCREENING MAMMOGRAM: ICD-10-CM

## 2024-04-25 PROCEDURE — 77067 SCR MAMMO BI INCL CAD: CPT

## 2024-04-25 PROCEDURE — 77063 BREAST TOMOSYNTHESIS BI: CPT

## 2024-07-10 ENCOUNTER — OFFICE VISIT (OUTPATIENT)
Dept: FAMILY MEDICINE CLINIC | Facility: CLINIC | Age: 54
End: 2024-07-10
Payer: COMMERCIAL

## 2024-07-10 VITALS
DIASTOLIC BLOOD PRESSURE: 88 MMHG | SYSTOLIC BLOOD PRESSURE: 142 MMHG | HEIGHT: 66 IN | TEMPERATURE: 97.8 F | WEIGHT: 223 LBS | HEART RATE: 72 BPM | BODY MASS INDEX: 35.84 KG/M2 | OXYGEN SATURATION: 97 % | RESPIRATION RATE: 16 BRPM

## 2024-07-10 DIAGNOSIS — R03.0 WHITE COAT SYNDROME WITHOUT DIAGNOSIS OF HYPERTENSION: ICD-10-CM

## 2024-07-10 DIAGNOSIS — R73.9 HYPERGLYCEMIA: ICD-10-CM

## 2024-07-10 DIAGNOSIS — M35.00 SJOGREN'S SYNDROME, WITH UNSPECIFIED ORGAN INVOLVEMENT: ICD-10-CM

## 2024-07-10 DIAGNOSIS — E55.9 VITAMIN D DEFICIENCY: ICD-10-CM

## 2024-07-10 DIAGNOSIS — E53.8 B12 DEFICIENCY: ICD-10-CM

## 2024-07-10 DIAGNOSIS — M62.89 PELVIC FLOOR DYSFUNCTION: ICD-10-CM

## 2024-07-10 DIAGNOSIS — R53.82 CHRONIC FATIGUE: ICD-10-CM

## 2024-07-10 DIAGNOSIS — Z00.00 WELL WOMAN EXAM (NO GYNECOLOGICAL EXAM): Primary | ICD-10-CM

## 2024-07-10 DIAGNOSIS — Z83.42 FAMILY HISTORY OF HYPERCHOLESTEROLEMIA: ICD-10-CM

## 2024-07-10 PROBLEM — G56.03 BILATERAL CARPAL TUNNEL SYNDROME: Status: RESOLVED | Noted: 2021-02-04 | Resolved: 2024-07-10

## 2024-07-10 PROBLEM — M79.10 MYALGIA: Status: RESOLVED | Noted: 2020-01-03 | Resolved: 2024-07-10

## 2024-07-10 PROCEDURE — 99396 PREV VISIT EST AGE 40-64: CPT | Performed by: FAMILY MEDICINE

## 2024-07-10 NOTE — PROGRESS NOTES
"Subjective     Chief Complaint   Patient presents with    Annual Exam       Edwige Nichols is a 54 y.o. female who presents for an annual exam. The patient has a couple complaints today. The patient is sexually active. GYN screening history: last pap: approximate date  and was normal. The patient wears seatbelts: yes. The patient participates in regular exercise:  some .   The patient reports that there is not domestic violence in her life.       She has  continued to do pelvic floor therapy and is doing this at home  Does not need referral at this time to return      History of abnormal Pap smear: no  Family history of uterine or ovarian cancer: no  Family history of colon cancer: no  History of abnormal mammogram: no  Family history of breast cancer: no  Colonoscopy history:         Menstrual History:  OB History          1    Para   1    Term   1            AB        Living             SAB        IAB        Ectopic        Molar        Multiple        Live Births   1                 No LMP recorded (lmp unknown). Patient is postmenopausal.         The following portions of the patient's history were reviewed and updated as appropriate:vital signs, allergies, current medications, past family history, past medical history, past social history, past surgical history, and problem list    Review of Systems   A comprehensive review of systems was negative.     Objective     /88   Pulse 72   Temp 97.8 °F (36.6 °C)   Resp 16   Ht 167.6 cm (65.98\")   Wt 101 kg (223 lb)   LMP  (LMP Unknown) Comment: denies pregnancy  SpO2 97%   BMI 36.01 kg/m²       Physical Exam  Vitals and nursing note reviewed.   Constitutional:       General: She is not in acute distress.     Appearance: Normal appearance. She is well-developed.   HENT:      Head: Normocephalic and atraumatic.   Eyes:      Extraocular Movements: Extraocular movements intact.      Conjunctiva/sclera: Conjunctivae normal. "   Cardiovascular:      Rate and Rhythm: Normal rate and regular rhythm.      Heart sounds: Normal heart sounds.   Pulmonary:      Effort: Pulmonary effort is normal.      Breath sounds: Normal breath sounds.   Abdominal:      General: Abdomen is flat. Bowel sounds are normal. There is no distension.      Palpations: Abdomen is soft.      Tenderness: There is no abdominal tenderness. There is no guarding or rebound.   Neurological:      Mental Status: She is alert and oriented to person, place, and time.   Psychiatric:         Mood and Affect: Mood normal.         Behavior: Behavior normal.         Thought Content: Thought content normal.         Judgment: Judgment normal.             Assessment & Plan     ASSESSMENT  Healthy female exam.    1. Well woman exam (no gynecological exam)    2. Pelvic floor dysfunction    3. Vitamin D deficiency    4. B12 deficiency    5. White coat syndrome without diagnosis of hypertension    6. Sjogren's syndrome, with unspecified organ involvement    7. Chronic fatigue    8. Hyperglycemia    9. Family history of hypercholesterolemia         PLAN  1.   Orders Placed This Encounter   Procedures    Comprehensive Metabolic Panel    Lipid Panel    HIGINIO With / DsDNA, RNP, Sjogrens A / B, Smith    C-reactive Protein    Sedimentation Rate    Vitamin D,25-Hydroxy    Rheumatoid Factor    Vitamin B12 & Folate    Hemoglobin A1c    TSH    CBC & Differential       2. Medications prescribed this encounter:    No orders of the defined types were placed in this encounter.      3. Counseled pt about well adult care inclduing diet and exercise as BMI is 36    Will check inflammatory markers and f/u pending labs      She does monitor her BP at home and it has been good.  Long Hx of white coat syndrome      Sebastian Green MD  7/10/2024

## 2024-07-11 LAB
25(OH)D3+25(OH)D2 SERPL-MCNC: 65.3 NG/ML (ref 30–100)
ALBUMIN SERPL-MCNC: 4.7 G/DL (ref 3.8–4.9)
ALP SERPL-CCNC: 98 IU/L (ref 44–121)
ALT SERPL-CCNC: 23 IU/L (ref 0–32)
ANA SER QL: POSITIVE
AST SERPL-CCNC: 23 IU/L (ref 0–40)
BASOPHILS # BLD AUTO: 0 X10E3/UL (ref 0–0.2)
BASOPHILS NFR BLD AUTO: 0 %
BILIRUB SERPL-MCNC: 0.3 MG/DL (ref 0–1.2)
BUN SERPL-MCNC: 14 MG/DL (ref 6–24)
BUN/CREAT SERPL: 15 (ref 9–23)
CALCIUM SERPL-MCNC: 9.8 MG/DL (ref 8.7–10.2)
CENTROMERE B AB SER-ACNC: <0.2 AI (ref 0–0.9)
CHLORIDE SERPL-SCNC: 105 MMOL/L (ref 96–106)
CHOLEST SERPL-MCNC: 137 MG/DL (ref 100–199)
CHROMATIN AB SERPL-ACNC: <0.2 AI (ref 0–0.9)
CO2 SERPL-SCNC: 24 MMOL/L (ref 20–29)
CREAT SERPL-MCNC: 0.91 MG/DL (ref 0.57–1)
CRP SERPL-MCNC: <1 MG/L (ref 0–10)
DSDNA AB SER-ACNC: <1 IU/ML (ref 0–9)
EGFRCR SERPLBLD CKD-EPI 2021: 75 ML/MIN/1.73
ENA JO1 AB SER-ACNC: <0.2 AI (ref 0–0.9)
ENA RNP AB SER-ACNC: <0.2 AI (ref 0–0.9)
ENA SCL70 AB SER-ACNC: <0.2 AI (ref 0–0.9)
ENA SM AB SER-ACNC: <0.2 AI (ref 0–0.9)
ENA SS-A AB SER-ACNC: <0.2 AI (ref 0–0.9)
ENA SS-B AB SER-ACNC: 1.1 AI (ref 0–0.9)
EOSINOPHIL # BLD AUTO: 0.1 X10E3/UL (ref 0–0.4)
EOSINOPHIL NFR BLD AUTO: 1 %
ERYTHROCYTE [DISTWIDTH] IN BLOOD BY AUTOMATED COUNT: 13.6 % (ref 11.7–15.4)
ERYTHROCYTE [SEDIMENTATION RATE] IN BLOOD BY WESTERGREN METHOD: 13 MM/HR (ref 0–40)
FOLATE SERPL-MCNC: >20 NG/ML
GLOBULIN SER CALC-MCNC: 2.6 G/DL (ref 1.5–4.5)
GLUCOSE SERPL-MCNC: 91 MG/DL (ref 70–99)
HBA1C MFR BLD: 5.9 % (ref 4.8–5.6)
HCT VFR BLD AUTO: 39 % (ref 34–46.6)
HDLC SERPL-MCNC: 48 MG/DL
HGB BLD-MCNC: 12.2 G/DL (ref 11.1–15.9)
IMM GRANULOCYTES # BLD AUTO: 0 X10E3/UL (ref 0–0.1)
IMM GRANULOCYTES NFR BLD AUTO: 0 %
LDLC SERPL CALC-MCNC: 74 MG/DL (ref 0–99)
LYMPHOCYTES # BLD AUTO: 2.8 X10E3/UL (ref 0.7–3.1)
LYMPHOCYTES NFR BLD AUTO: 40 %
Lab: ABNORMAL
MCH RBC QN AUTO: 26.3 PG (ref 26.6–33)
MCHC RBC AUTO-ENTMCNC: 31.3 G/DL (ref 31.5–35.7)
MCV RBC AUTO: 84 FL (ref 79–97)
MONOCYTES # BLD AUTO: 0.4 X10E3/UL (ref 0.1–0.9)
MONOCYTES NFR BLD AUTO: 5 %
NEUTROPHILS # BLD AUTO: 3.8 X10E3/UL (ref 1.4–7)
NEUTROPHILS NFR BLD AUTO: 54 %
PLATELET # BLD AUTO: 256 X10E3/UL (ref 150–450)
POTASSIUM SERPL-SCNC: 4.2 MMOL/L (ref 3.5–5.2)
PROT SERPL-MCNC: 7.3 G/DL (ref 6–8.5)
RBC # BLD AUTO: 4.64 X10E6/UL (ref 3.77–5.28)
RHEUMATOID FACT SERPL-ACNC: <10 IU/ML
SODIUM SERPL-SCNC: 142 MMOL/L (ref 134–144)
TRIGL SERPL-MCNC: 77 MG/DL (ref 0–149)
TSH SERPL DL<=0.005 MIU/L-ACNC: 2.12 UIU/ML (ref 0.45–4.5)
VIT B12 SERPL-MCNC: 663 PG/ML (ref 232–1245)
VLDLC SERPL CALC-MCNC: 15 MG/DL (ref 5–40)
WBC # BLD AUTO: 7.1 X10E3/UL (ref 3.4–10.8)

## 2024-07-12 ENCOUNTER — TELEPHONE (OUTPATIENT)
Dept: FAMILY MEDICINE CLINIC | Facility: CLINIC | Age: 54
End: 2024-07-12
Payer: COMMERCIAL

## 2024-07-12 DIAGNOSIS — R76.8 POSITIVE ANA (ANTINUCLEAR ANTIBODY): ICD-10-CM

## 2024-07-12 DIAGNOSIS — M35.00 SJOGREN'S SYNDROME, WITH UNSPECIFIED ORGAN INVOLVEMENT: Primary | ICD-10-CM

## 2024-07-12 NOTE — TELEPHONE ENCOUNTER
Pt informed of lab results. States she needs a new referral to rheumatology because she left the last practice after conflict w/ that provider. Wants to see a different specialist

## 2025-03-17 ENCOUNTER — TRANSCRIBE ORDERS (OUTPATIENT)
Dept: ADMINISTRATIVE | Facility: HOSPITAL | Age: 55
End: 2025-03-17
Payer: COMMERCIAL

## 2025-03-17 DIAGNOSIS — Z12.31 VISIT FOR SCREENING MAMMOGRAM: Primary | ICD-10-CM

## 2025-04-30 LAB
NCCN CRITERIA FLAG: NORMAL
TYRER CUZICK SCORE: 14.9

## 2025-05-05 ENCOUNTER — HOSPITAL ENCOUNTER (OUTPATIENT)
Dept: MAMMOGRAPHY | Facility: HOSPITAL | Age: 55
Discharge: HOME | End: 2025-05-05
Admitting: FAMILY MEDICINE
Payer: COMMERCIAL

## 2025-05-05 DIAGNOSIS — Z12.31 VISIT FOR SCREENING MAMMOGRAM: ICD-10-CM

## 2025-05-05 PROCEDURE — 77067 SCR MAMMO BI INCL CAD: CPT

## 2025-05-05 PROCEDURE — 77063 BREAST TOMOSYNTHESIS BI: CPT

## 2025-05-06 PROCEDURE — 77067 SCR MAMMO BI INCL CAD: CPT | Performed by: RADIOLOGY

## 2025-05-06 PROCEDURE — 77063 BREAST TOMOSYNTHESIS BI: CPT | Performed by: RADIOLOGY
